# Patient Record
Sex: MALE | Race: WHITE | Employment: OTHER | URBAN - NONMETROPOLITAN AREA
[De-identification: names, ages, dates, MRNs, and addresses within clinical notes are randomized per-mention and may not be internally consistent; named-entity substitution may affect disease eponyms.]

---

## 2018-02-27 DIAGNOSIS — G80.8 OTHER CEREBRAL PALSY (HCC): ICD-10-CM

## 2018-02-27 DIAGNOSIS — N18.9 CHRONIC KIDNEY DISEASE, UNSPECIFIED CKD STAGE: ICD-10-CM

## 2018-02-27 DIAGNOSIS — M15.0 PRIMARY GENERALIZED (OSTEO)ARTHRITIS: ICD-10-CM

## 2018-02-27 DIAGNOSIS — I25.10 ATHEROSCLEROSIS OF NATIVE CORONARY ARTERY OF NATIVE HEART WITHOUT ANGINA PECTORIS: ICD-10-CM

## 2018-02-27 DIAGNOSIS — D64.9 ANEMIA, UNSPECIFIED TYPE: ICD-10-CM

## 2018-02-27 DIAGNOSIS — N39.41 URGE INCONTINENCE OF URINE: ICD-10-CM

## 2018-02-27 DIAGNOSIS — I10 ESSENTIAL (PRIMARY) HYPERTENSION: ICD-10-CM

## 2018-02-27 DIAGNOSIS — K21.9 GASTROESOPHAGEAL REFLUX DISEASE WITHOUT ESOPHAGITIS: ICD-10-CM

## 2018-02-27 DIAGNOSIS — R13.12 DYSPHAGIA, OROPHARYNGEAL PHASE: ICD-10-CM

## 2018-02-27 DIAGNOSIS — N40.1 BENIGN PROSTATIC HYPERPLASIA WITH LOWER URINARY TRACT SYMPTOMS, SYMPTOM DETAILS UNSPECIFIED: ICD-10-CM

## 2018-02-27 PROBLEM — N40.0 BENIGN ENLARGEMENT OF PROSTATE: Status: ACTIVE | Noted: 2018-02-27

## 2018-03-14 ENCOUNTER — OUTSIDE SERVICES (OUTPATIENT)
Dept: FAMILY MEDICINE CLINIC | Age: 78
End: 2018-03-14
Payer: MEDICARE

## 2018-03-14 DIAGNOSIS — K21.9 GASTROESOPHAGEAL REFLUX DISEASE WITHOUT ESOPHAGITIS: ICD-10-CM

## 2018-03-14 DIAGNOSIS — F79 MENTAL RETARDATION: ICD-10-CM

## 2018-03-14 DIAGNOSIS — N18.9 CHRONIC KIDNEY DISEASE, UNSPECIFIED CKD STAGE: ICD-10-CM

## 2018-03-14 DIAGNOSIS — N40.1 BENIGN PROSTATIC HYPERPLASIA WITH LOWER URINARY TRACT SYMPTOMS, SYMPTOM DETAILS UNSPECIFIED: ICD-10-CM

## 2018-03-14 DIAGNOSIS — R13.12 DYSPHAGIA, OROPHARYNGEAL PHASE: ICD-10-CM

## 2018-03-14 DIAGNOSIS — N39.41 URGE INCONTINENCE OF URINE: ICD-10-CM

## 2018-03-14 DIAGNOSIS — M15.0 PRIMARY GENERALIZED (OSTEO)ARTHRITIS: ICD-10-CM

## 2018-03-14 DIAGNOSIS — G80.8 OTHER CEREBRAL PALSY (HCC): Primary | ICD-10-CM

## 2018-03-14 DIAGNOSIS — I10 ESSENTIAL (PRIMARY) HYPERTENSION: ICD-10-CM

## 2018-03-14 DIAGNOSIS — I25.10 ATHEROSCLEROSIS OF NATIVE CORONARY ARTERY OF NATIVE HEART WITHOUT ANGINA PECTORIS: ICD-10-CM

## 2018-03-14 PROCEDURE — 99305 1ST NF CARE MODERATE MDM 35: CPT | Performed by: FAMILY MEDICINE

## 2018-03-15 PROBLEM — F79 MENTAL RETARDATION: Status: ACTIVE | Noted: 2018-03-15

## 2018-03-15 NOTE — PROGRESS NOTES
[address] [address]  [phone]  Dept: [phone]    The following note was scribed by: NATALI Russell is a 78 y.o. male being seen for his initial H&P visit. Location of visit: [address] [place] [address].    HPI:  Admission History:Aruna was admitted to [facility] 3 after he was taken to the ER after a decline in health. He was living in a group home for his cerebal palsy and mental retardation. He was found to be anemic and hypernatremic and admitted to [address] [place] [address] on 11/20/2007 for more thorough long term care. He was previously a patient of Dr. Genesis Santana but was taken under my care after his passing in February 2018. New today: There are no new issues or complaints       ROS:  General Constitutional: Denies fever. Denies lightheadedness. Respiratory: Denies cough. Denies shortness of breath. Denies wheezing. Cardiovascular: Denies chest pain at rest. Denies irregular heartbeat. Gastrointestinal: Denies abdominal pain. Denies blood in the stool. Denies constipation. Denies diarrhea. Denies nausea. Denies vomiting. Genitourinary: Denies blood in the urine. Denies difficulty urinating. Denies frequent urination. Denies painful urination. Skin: Denies rash. Neurologic: Denies falls. Denies dizziness. Esmer Shear Psychiatric: Denies sleep disturbance. Denies anxiety. Denies depressed mood. PHYSICAL EXAM:    General Appearance: in no acute distress, well developed, well nourished, in wheelchair  Eyes: pupils equal, round reactive to light and accommodation. Oral Cavity: mucosa moist.  Neck/Thyroid: neck supple, no cervical lymphadenopathy  Skin: warm and dry  Heart: regular rate and rhythm. No murmurs. S1, S2 normal, no gallops, rate 70  Lungs: breath sounds shallow  Abdomen: soft, nontender, nondistended, no masses or organomegaly.   Extremities: AFO on right, 3+ edema in legs, wearing compression stockings  Peripheral Pulses: 2+ throughout, symetric. Neurologic: sensory exam intact. Psych: MR, communicates with gestures and grunts    ASSESSMENT:  1. Other cerebral palsy ([address]     2. Dysphagia, oropharyngeal phase     3. Essential (primary) hypertension     4. Primary generalized (osteo)arthritis     5. Gastroesophageal reflux disease without esophagitis     6. Atherosclerosis of native coronary artery of native heart without angina pectoris     7. Chronic kidney disease, unspecified CKD stage     8. Urge incontinence of urine     9. Benign prostatic hyperplasia with lower urinary tract symptoms, symptom details unspecified     10. Mental retardation         PLAN:  Xena Nicoláscierra looks great today, no changes are made    No orders of the defined types were placed in this encounter. No orders of the defined types were placed in this encounter. The documentation recorded by the scribe, accurately reflects the services I personally performed and the decisions made by me.  Spencer Stout MD

## 2018-04-25 ENCOUNTER — OUTSIDE SERVICES (OUTPATIENT)
Dept: FAMILY MEDICINE CLINIC | Age: 78
End: 2018-04-25
Payer: MEDICARE

## 2018-04-25 DIAGNOSIS — N18.9 CHRONIC KIDNEY DISEASE, UNSPECIFIED CKD STAGE: ICD-10-CM

## 2018-04-25 DIAGNOSIS — F79 MENTAL RETARDATION: ICD-10-CM

## 2018-04-25 DIAGNOSIS — R13.12 DYSPHAGIA, OROPHARYNGEAL PHASE: ICD-10-CM

## 2018-04-25 DIAGNOSIS — M15.0 PRIMARY GENERALIZED (OSTEO)ARTHRITIS: ICD-10-CM

## 2018-04-25 DIAGNOSIS — G80.8 OTHER CEREBRAL PALSY (HCC): Primary | ICD-10-CM

## 2018-04-25 DIAGNOSIS — K21.9 GASTROESOPHAGEAL REFLUX DISEASE WITHOUT ESOPHAGITIS: ICD-10-CM

## 2018-04-25 DIAGNOSIS — N40.1 BENIGN PROSTATIC HYPERPLASIA WITH LOWER URINARY TRACT SYMPTOMS, SYMPTOM DETAILS UNSPECIFIED: ICD-10-CM

## 2018-04-25 DIAGNOSIS — I25.10 ATHEROSCLEROSIS OF NATIVE CORONARY ARTERY OF NATIVE HEART WITHOUT ANGINA PECTORIS: ICD-10-CM

## 2018-04-25 DIAGNOSIS — I10 ESSENTIAL (PRIMARY) HYPERTENSION: ICD-10-CM

## 2018-04-25 PROCEDURE — 3017F COLORECTAL CA SCREEN DOC REV: CPT | Performed by: FAMILY MEDICINE

## 2018-04-25 PROCEDURE — 1123F ACP DISCUSS/DSCN MKR DOCD: CPT | Performed by: FAMILY MEDICINE

## 2018-04-25 PROCEDURE — 99308 SBSQ NF CARE LOW MDM 20: CPT | Performed by: FAMILY MEDICINE

## 2018-05-30 ENCOUNTER — OUTSIDE SERVICES (OUTPATIENT)
Dept: FAMILY MEDICINE CLINIC | Age: 78
End: 2018-05-30
Payer: MEDICARE

## 2018-05-30 DIAGNOSIS — F79 MENTAL RETARDATION: Primary | ICD-10-CM

## 2018-05-30 DIAGNOSIS — G80.8 OTHER CEREBRAL PALSY (HCC): ICD-10-CM

## 2018-05-30 DIAGNOSIS — I25.10 ATHEROSCLEROSIS OF NATIVE CORONARY ARTERY OF NATIVE HEART WITHOUT ANGINA PECTORIS: ICD-10-CM

## 2018-05-30 DIAGNOSIS — N18.9 CHRONIC KIDNEY DISEASE, UNSPECIFIED CKD STAGE: ICD-10-CM

## 2018-05-30 DIAGNOSIS — I10 ESSENTIAL (PRIMARY) HYPERTENSION: ICD-10-CM

## 2018-05-30 DIAGNOSIS — K21.9 GASTROESOPHAGEAL REFLUX DISEASE WITHOUT ESOPHAGITIS: ICD-10-CM

## 2018-05-30 PROCEDURE — 99308 SBSQ NF CARE LOW MDM 20: CPT | Performed by: FAMILY MEDICINE

## 2018-05-30 PROCEDURE — 1123F ACP DISCUSS/DSCN MKR DOCD: CPT | Performed by: FAMILY MEDICINE

## 2018-06-27 ENCOUNTER — OUTSIDE SERVICES (OUTPATIENT)
Dept: FAMILY MEDICINE CLINIC | Age: 78
End: 2018-06-27
Payer: MEDICARE

## 2018-06-27 DIAGNOSIS — D64.9 ANEMIA, UNSPECIFIED TYPE: ICD-10-CM

## 2018-06-27 DIAGNOSIS — I25.10 ATHEROSCLEROSIS OF NATIVE CORONARY ARTERY OF NATIVE HEART WITHOUT ANGINA PECTORIS: ICD-10-CM

## 2018-06-27 DIAGNOSIS — M15.0 PRIMARY GENERALIZED (OSTEO)ARTHRITIS: ICD-10-CM

## 2018-06-27 DIAGNOSIS — G80.8 OTHER CEREBRAL PALSY (HCC): Primary | ICD-10-CM

## 2018-06-27 DIAGNOSIS — F79 MENTAL RETARDATION: ICD-10-CM

## 2018-06-27 DIAGNOSIS — K59.09 CHRONIC CONSTIPATION: ICD-10-CM

## 2018-06-27 DIAGNOSIS — K21.9 GASTROESOPHAGEAL REFLUX DISEASE WITHOUT ESOPHAGITIS: ICD-10-CM

## 2018-06-27 DIAGNOSIS — I50.810 CHF WITH RIGHT HEART FAILURE (HCC): ICD-10-CM

## 2018-06-27 DIAGNOSIS — I10 ESSENTIAL (PRIMARY) HYPERTENSION: ICD-10-CM

## 2018-06-27 DIAGNOSIS — N18.9 CHRONIC KIDNEY DISEASE, UNSPECIFIED CKD STAGE: ICD-10-CM

## 2018-06-27 DIAGNOSIS — R10.84 GENERALIZED ABDOMINAL PAIN: ICD-10-CM

## 2018-06-27 DIAGNOSIS — R13.12 DYSPHAGIA, OROPHARYNGEAL PHASE: ICD-10-CM

## 2018-06-27 PROCEDURE — 99309 SBSQ NF CARE MODERATE MDM 30: CPT | Performed by: FAMILY MEDICINE

## 2018-06-27 PROCEDURE — 1123F ACP DISCUSS/DSCN MKR DOCD: CPT | Performed by: FAMILY MEDICINE

## 2018-07-11 ENCOUNTER — OUTSIDE SERVICES (OUTPATIENT)
Dept: FAMILY MEDICINE CLINIC | Age: 78
End: 2018-07-11
Payer: MEDICARE

## 2018-07-11 DIAGNOSIS — N18.9 CHRONIC KIDNEY DISEASE, UNSPECIFIED CKD STAGE: ICD-10-CM

## 2018-07-11 DIAGNOSIS — D64.9 ANEMIA, UNSPECIFIED TYPE: ICD-10-CM

## 2018-07-11 DIAGNOSIS — I50.810 CHF WITH RIGHT HEART FAILURE (HCC): ICD-10-CM

## 2018-07-11 DIAGNOSIS — R13.12 DYSPHAGIA, OROPHARYNGEAL PHASE: ICD-10-CM

## 2018-07-11 DIAGNOSIS — I10 UNCONTROLLED HYPERTENSION: ICD-10-CM

## 2018-07-11 DIAGNOSIS — G80.8 OTHER CEREBRAL PALSY (HCC): ICD-10-CM

## 2018-07-11 DIAGNOSIS — I51.7 LEFT VENTRICULAR HYPERTROPHY: Primary | ICD-10-CM

## 2018-07-11 DIAGNOSIS — K59.09 CHRONIC CONSTIPATION: ICD-10-CM

## 2018-07-11 DIAGNOSIS — I25.10 ATHEROSCLEROSIS OF NATIVE CORONARY ARTERY OF NATIVE HEART WITHOUT ANGINA PECTORIS: ICD-10-CM

## 2018-07-11 DIAGNOSIS — K21.9 GASTROESOPHAGEAL REFLUX DISEASE WITHOUT ESOPHAGITIS: ICD-10-CM

## 2018-07-11 DIAGNOSIS — F79 MENTAL RETARDATION: ICD-10-CM

## 2018-07-11 DIAGNOSIS — R10.84 GENERALIZED ABDOMINAL PAIN: ICD-10-CM

## 2018-07-11 DIAGNOSIS — M15.0 PRIMARY GENERALIZED (OSTEO)ARTHRITIS: ICD-10-CM

## 2018-07-11 PROCEDURE — 1101F PT FALLS ASSESS-DOCD LE1/YR: CPT | Performed by: FAMILY MEDICINE

## 2018-07-11 PROCEDURE — 1123F ACP DISCUSS/DSCN MKR DOCD: CPT | Performed by: FAMILY MEDICINE

## 2018-07-11 PROCEDURE — 99309 SBSQ NF CARE MODERATE MDM 30: CPT | Performed by: FAMILY MEDICINE

## 2018-07-11 NOTE — PROGRESS NOTES
painful urination. Skin:  Denies rash. Neurologic: Denies falls. Denies dizziness. Psychiatric: Denies sleep disturbance. Denies anxiety. Denies depressed mood. PHYSICAL EXAM:    General Appearance: in no acute distress, well nourished. Eyes: pupils equal, round reactive to light and accommodation. Oral Cavity: mucosa moist.  Neck/Thyroid:  no cervical lymphadenopathy, no thyromegaly  Skin: warm and dry  Heart: regular rate and rhythm. No murmurs. S1, S2 normal, no gallops. Rate 80  Lungs: clear to auscultation bilaterally. Abdomen:soft, nontender, nondistended, no masses or organomegaly. Extremities: no cyanosis, 3+ edema L, 1-2+ edema R  Peripheral Pulses: 2+ throughout, symetric. Neurologic: verbally responsive, moves extremities. Psych: normal affect, speech fluent. ASSESSMENT:   Diagnosis Orders   1. Left ventricular hypertrophy     2. Other cerebral palsy ([address]     3. Dysphagia, oropharyngeal phase     4. Primary generalized (osteo)arthritis     5. Gastroesophageal reflux disease without esophagitis     6. Anemia, unspecified type     7. Atherosclerosis of native coronary artery of native heart without angina pectoris     8. Chronic kidney disease, unspecified CKD stage     9. Mental retardation     10. Chronic constipation     11. Generalized abdominal pain     12. CHF with right heart failure     13. Uncontrolled hypertension       PLAN:  He is following up with Dr. Melburn Opitz later this month for microscopic hematuria. I will start him on Lisinopril 10 mg qd and check his CBC and BMP in 1 week. His pressures have been in the 150s. I, Dr. Jessica Montalvo, personally performed the services described in this documentation as scribed by RENO Montague in my presence, and is both accurate and complete.

## 2018-07-25 ENCOUNTER — HOSPITAL ENCOUNTER (OUTPATIENT)
Age: 78
Discharge: HOME | End: 2018-07-25
Payer: MEDICARE

## 2018-07-25 ENCOUNTER — OFFICE VISIT (OUTPATIENT)
Dept: UROLOGY | Age: 78
End: 2018-07-25
Payer: MEDICARE

## 2018-07-25 ENCOUNTER — HOSPITAL ENCOUNTER (OUTPATIENT)
Dept: LAB | Age: 78
Discharge: HOME | End: 2018-07-25
Payer: MEDICARE

## 2018-07-25 VITALS
DIASTOLIC BLOOD PRESSURE: 66 MMHG | TEMPERATURE: 98.3 F | WEIGHT: 219 LBS | BODY MASS INDEX: 37.39 KG/M2 | SYSTOLIC BLOOD PRESSURE: 122 MMHG | HEIGHT: 64 IN

## 2018-07-25 DIAGNOSIS — C79.51 MALIGNANT NEOPLASM METASTATIC TO BONE (HCC): ICD-10-CM

## 2018-07-25 DIAGNOSIS — R31.29 MICROSCOPIC HEMATURIA: Primary | ICD-10-CM

## 2018-07-25 DIAGNOSIS — N28.89 RIGHT RENAL MASS: ICD-10-CM

## 2018-07-25 DIAGNOSIS — R31.29 MICROSCOPIC HEMATURIA: ICD-10-CM

## 2018-07-25 LAB
ANION GAP SERPL CALCULATED.3IONS-SCNC: 10 MMOL/L (ref 9–17)
BUN BLDV-MCNC: 26 MG/DL (ref 8–23)
BUN/CREAT BLD: 20 (ref 9–20)
CALCIUM SERPL-MCNC: 9.1 MG/DL (ref 8.6–10.4)
CHLORIDE BLD-SCNC: 101 MMOL/L (ref 98–107)
CO2: 29 MMOL/L (ref 20–31)
CREAT SERPL-MCNC: 1.29 MG/DL (ref 0.7–1.2)
GFR AFRICAN AMERICAN: >60 ML/MIN
GFR NON-AFRICAN AMERICAN: 54 ML/MIN
GFR SERPL CREATININE-BSD FRML MDRD: ABNORMAL ML/MIN/{1.73_M2}
GFR SERPL CREATININE-BSD FRML MDRD: ABNORMAL ML/MIN/{1.73_M2}
GLUCOSE BLD-MCNC: 108 MG/DL (ref 70–99)
POTASSIUM SERPL-SCNC: 4.5 MMOL/L (ref 3.7–5.3)
PROSTATE SPECIFIC ANTIGEN: 0.24 UG/L
SODIUM BLD-SCNC: 140 MMOL/L (ref 135–144)

## 2018-07-25 PROCEDURE — G8417 CALC BMI ABV UP PARAM F/U: HCPCS | Performed by: NURSE PRACTITIONER

## 2018-07-25 PROCEDURE — 99204 OFFICE O/P NEW MOD 45 MIN: CPT | Performed by: NURSE PRACTITIONER

## 2018-07-25 PROCEDURE — 4040F PNEUMOC VAC/ADMIN/RCVD: CPT | Performed by: NURSE PRACTITIONER

## 2018-07-25 PROCEDURE — 1123F ACP DISCUSS/DSCN MKR DOCD: CPT | Performed by: NURSE PRACTITIONER

## 2018-07-25 PROCEDURE — 80048 BASIC METABOLIC PNL TOTAL CA: CPT

## 2018-07-25 PROCEDURE — G8427 DOCREV CUR MEDS BY ELIG CLIN: HCPCS | Performed by: NURSE PRACTITIONER

## 2018-07-25 PROCEDURE — G8599 NO ASA/ANTIPLAT THER USE RNG: HCPCS | Performed by: NURSE PRACTITIONER

## 2018-07-25 PROCEDURE — 1101F PT FALLS ASSESS-DOCD LE1/YR: CPT | Performed by: NURSE PRACTITIONER

## 2018-07-25 PROCEDURE — 36415 COLL VENOUS BLD VENIPUNCTURE: CPT

## 2018-07-25 PROCEDURE — 1036F TOBACCO NON-USER: CPT | Performed by: NURSE PRACTITIONER

## 2018-07-25 PROCEDURE — 84153 ASSAY OF PSA TOTAL: CPT

## 2018-07-25 RX ORDER — GUARN/MA-HUANG/P.GIN/S.GINSENG
600 TABLET ORAL 2 TIMES DAILY
Status: ON HOLD | COMMUNITY
End: 2018-11-20 | Stop reason: HOSPADM

## 2018-07-25 RX ORDER — AMLODIPINE BESYLATE 5 MG/1
5 TABLET ORAL DAILY
Status: ON HOLD | COMMUNITY
Start: 2018-05-21 | End: 2018-11-20 | Stop reason: HOSPADM

## 2018-07-25 RX ORDER — LORATADINE 10 MG/1
10 TABLET ORAL
COMMUNITY

## 2018-07-25 RX ORDER — DOCUSATE SODIUM 100 MG/1
100 CAPSULE, LIQUID FILLED ORAL
Status: ON HOLD | COMMUNITY
End: 2018-11-20 | Stop reason: HOSPADM

## 2018-07-25 RX ORDER — NITROGLYCERIN 0.4 MG/1
0.4 TABLET SUBLINGUAL
COMMUNITY

## 2018-07-25 RX ORDER — DIGOXIN 125 UG/1
125 TABLET ORAL DAILY
COMMUNITY
Start: 2018-05-04

## 2018-07-25 RX ORDER — ESOMEPRAZOLE MAGNESIUM 40 MG/1
40 CAPSULE, DELAYED RELEASE ORAL
COMMUNITY
Start: 2018-05-29

## 2018-07-25 RX ORDER — LACTULOSE 10 G/15ML
SOLUTION ORAL; RECTAL
COMMUNITY
Start: 2018-05-28 | End: 2018-11-14

## 2018-07-25 RX ORDER — GLYCOPYRROLATE 1 MG/1
1 TABLET ORAL
COMMUNITY
End: 2018-11-14

## 2018-07-25 RX ORDER — FOLIC ACID 1 MG/1
1 TABLET ORAL DAILY
COMMUNITY

## 2018-07-25 RX ORDER — LOPERAMIDE HYDROCHLORIDE 2 MG/1
CAPSULE ORAL
Status: ON HOLD | COMMUNITY
Start: 2018-05-16 | End: 2018-11-20 | Stop reason: HOSPADM

## 2018-07-25 RX ORDER — ACETAMINOPHEN 500 MG
1000 TABLET ORAL
COMMUNITY

## 2018-07-25 RX ORDER — ACETAMINOPHEN 650 MG/1
650 SUPPOSITORY RECTAL
COMMUNITY

## 2018-07-25 RX ORDER — FUROSEMIDE 40 MG/1
40 TABLET ORAL DAILY
Status: ON HOLD | COMMUNITY
End: 2018-11-20 | Stop reason: HOSPADM

## 2018-07-25 RX ORDER — FINASTERIDE 5 MG/1
TABLET, FILM COATED ORAL
COMMUNITY
Start: 2018-05-28 | End: 2018-11-14

## 2018-07-25 RX ORDER — GLYCOPYRROLATE 1 MG/1
TABLET ORAL
COMMUNITY
Start: 2018-05-14 | End: 2018-07-25 | Stop reason: CLARIF

## 2018-08-08 ENCOUNTER — HOSPITAL ENCOUNTER (OUTPATIENT)
Dept: CT IMAGING | Age: 78
Discharge: HOME | End: 2018-08-10
Payer: MEDICARE

## 2018-08-08 ENCOUNTER — PROCEDURE VISIT (OUTPATIENT)
Dept: UROLOGY | Age: 78
End: 2018-08-08
Payer: MEDICARE

## 2018-08-08 VITALS — BODY MASS INDEX: 37.59 KG/M2 | WEIGHT: 219 LBS

## 2018-08-08 DIAGNOSIS — N28.89 RIGHT RENAL MASS: ICD-10-CM

## 2018-08-08 DIAGNOSIS — C79.51 MALIGNANT NEOPLASM METASTATIC TO BONE (HCC): ICD-10-CM

## 2018-08-08 DIAGNOSIS — R31.29 MICROSCOPIC HEMATURIA: ICD-10-CM

## 2018-08-08 DIAGNOSIS — N28.89 RENAL MASS: Primary | ICD-10-CM

## 2018-08-08 DIAGNOSIS — R31.29 MICROHEMATURIA: ICD-10-CM

## 2018-08-08 PROCEDURE — 99214 OFFICE O/P EST MOD 30 MIN: CPT | Performed by: UROLOGY

## 2018-08-08 PROCEDURE — 52000 CYSTOURETHROSCOPY: CPT | Performed by: UROLOGY

## 2018-08-08 PROCEDURE — G8427 DOCREV CUR MEDS BY ELIG CLIN: HCPCS | Performed by: UROLOGY

## 2018-08-08 PROCEDURE — 74178 CT ABD&PLV WO CNTR FLWD CNTR: CPT

## 2018-08-08 PROCEDURE — G8417 CALC BMI ABV UP PARAM F/U: HCPCS | Performed by: UROLOGY

## 2018-08-08 PROCEDURE — 4040F PNEUMOC VAC/ADMIN/RCVD: CPT | Performed by: UROLOGY

## 2018-08-08 PROCEDURE — 6360000004 HC RX CONTRAST MEDICATION: Performed by: NURSE PRACTITIONER

## 2018-08-08 PROCEDURE — 1123F ACP DISCUSS/DSCN MKR DOCD: CPT | Performed by: UROLOGY

## 2018-08-08 PROCEDURE — 1101F PT FALLS ASSESS-DOCD LE1/YR: CPT | Performed by: UROLOGY

## 2018-08-08 PROCEDURE — G8599 NO ASA/ANTIPLAT THER USE RNG: HCPCS | Performed by: UROLOGY

## 2018-08-08 PROCEDURE — 1036F TOBACCO NON-USER: CPT | Performed by: UROLOGY

## 2018-08-08 RX ADMIN — IOPAMIDOL 75 ML: 612 INJECTION, SOLUTION INTRAVENOUS at 13:37

## 2018-08-08 ASSESSMENT — ENCOUNTER SYMPTOMS
WHEEZING: 0
COLOR CHANGE: 0
ABDOMINAL PAIN: 0
BACK PAIN: 0
NAUSEA: 0
COUGH: 0
EYE REDNESS: 0
SHORTNESS OF BREATH: 0
EYE PAIN: 0
VOMITING: 0

## 2018-08-12 ASSESSMENT — ENCOUNTER SYMPTOMS
EYE REDNESS: 0
EYE PAIN: 0
BACK PAIN: 0
ABDOMINAL PAIN: 0
WHEEZING: 0
VOMITING: 0
NAUSEA: 0
CONSTIPATION: 0
SHORTNESS OF BREATH: 0
COUGH: 0
COLOR CHANGE: 0

## 2018-08-13 NOTE — PROGRESS NOTES
circumcised  Urethral meatus normal  Scrotal exam normal  Testicles normal bilaterally    No results found for this visit on 07/25/18. Lab Results   Component Value Date    PSA 0.24 07/25/2018     Lab Results   Component Value Date    BUN 26 (H) 07/25/2018     Lab Results   Component Value Date    CREATININE 1.29 (H) 07/25/2018     Review of Systems   Constitutional: Negative for appetite change, chills and fever. Eyes: Negative for pain, redness and visual disturbance. Respiratory: Negative for cough, shortness of breath and wheezing. Cardiovascular: Negative for chest pain and leg swelling. Gastrointestinal: Negative for abdominal pain, constipation, nausea and vomiting. Genitourinary: Negative for decreased urine volume, difficulty urinating, discharge, dysuria, enuresis, flank pain, frequency, hematuria, penile pain, scrotal swelling, testicular pain and urgency. Musculoskeletal: Negative for back pain, joint swelling and myalgias. Skin: Negative for color change, rash and wound. Neurological: Negative for dizziness, tremors and numbness. Hematological: Negative for adenopathy. Does not bruise/bleed easily. Objective:   Physical Exam    Assessment:       Diagnosis Orders   1. Microscopic hematuria  CT ABDOMEN PELVIS W WO CONTRAST Additional Contrast? None    Urinalysis with Microscopic    Urine culture clean catch    PSA, Diagnostic    Basic Metabolic Panel   2. Right renal mass  CT ABDOMEN PELVIS W WO CONTRAST Additional Contrast? None    Urinalysis with Microscopic    Urine culture clean catch    PSA, Diagnostic    Basic Metabolic Panel   3. Malignant neoplasm metastatic to bone [facility])  CT ABDOMEN PELVIS W WO CONTRAST Additional Contrast? None    Urinalysis with Microscopic    Urine culture clean catch    PSA, Diagnostic    Basic Metabolic Panel           Plan: We will check a diagnostic PSA. We will check a UA C&S.   We will proceed with a CT urogram, then patient will return

## 2018-08-29 ENCOUNTER — OUTSIDE SERVICES (OUTPATIENT)
Dept: FAMILY MEDICINE CLINIC | Age: 78
End: 2018-08-29
Payer: MEDICARE

## 2018-08-29 DIAGNOSIS — D64.9 ANEMIA, UNSPECIFIED TYPE: ICD-10-CM

## 2018-08-29 DIAGNOSIS — I10 ESSENTIAL (PRIMARY) HYPERTENSION: ICD-10-CM

## 2018-08-29 DIAGNOSIS — G80.8 OTHER CEREBRAL PALSY (HCC): ICD-10-CM

## 2018-08-29 DIAGNOSIS — M15.0 PRIMARY GENERALIZED (OSTEO)ARTHRITIS: ICD-10-CM

## 2018-08-29 DIAGNOSIS — R13.12 DYSPHAGIA, OROPHARYNGEAL PHASE: ICD-10-CM

## 2018-08-29 DIAGNOSIS — I50.810 CHF WITH RIGHT HEART FAILURE (HCC): ICD-10-CM

## 2018-08-29 DIAGNOSIS — K59.09 CHRONIC CONSTIPATION: ICD-10-CM

## 2018-08-29 DIAGNOSIS — I25.10 ATHEROSCLEROSIS OF NATIVE CORONARY ARTERY OF NATIVE HEART WITHOUT ANGINA PECTORIS: ICD-10-CM

## 2018-08-29 DIAGNOSIS — R10.84 GENERALIZED ABDOMINAL PAIN: ICD-10-CM

## 2018-08-29 DIAGNOSIS — N18.9 CHRONIC KIDNEY DISEASE, UNSPECIFIED CKD STAGE: ICD-10-CM

## 2018-08-29 DIAGNOSIS — F79 MENTAL RETARDATION: ICD-10-CM

## 2018-08-29 DIAGNOSIS — K21.9 GASTROESOPHAGEAL REFLUX DISEASE WITHOUT ESOPHAGITIS: ICD-10-CM

## 2018-08-29 PROCEDURE — 99308 SBSQ NF CARE LOW MDM 20: CPT | Performed by: FAMILY MEDICINE

## 2018-08-29 PROCEDURE — 1123F ACP DISCUSS/DSCN MKR DOCD: CPT | Performed by: FAMILY MEDICINE

## 2018-08-29 PROCEDURE — 1101F PT FALLS ASSESS-DOCD LE1/YR: CPT | Performed by: FAMILY MEDICINE

## 2018-08-29 NOTE — PROGRESS NOTES
[address] [address]  [phone]  Dept: [phone]    RENO NEWBY RMA, am scribing for and in the presence of Dr. Aguila Benedict. 8/29/18/9:15am/SNP    Nathaly Calvo is a 70 y.o. male being seen for his monthly follow up. Location of visit: [address] [place] [address].    HPI:  Admission History:  Linda Ramírez was admitted to [facility] 3 after he was taken to the ER after a decline in health. He was living in a group home for his cerebal palsy and mental retardation. He was found to be anemic and hypernatremic and admitted to [address] [place] [address] on 11/20/2007 for more thorough long term care. He was previously a patient of Dr. Charmayne Angst but was taken under my care after his passing in February 2018. Last visit:  He is following up with Dr. Jarod Marquez later this month for microscopic hematuria. I will start him on Lisinopril 10 mg qd and check his CBC and BMP in 1 week. His pressures have been in the 150s.     Faxes since last seen:  07/11/18 CT of abd and pelvis showed an exophytic lesion on anterior left kidney, development of a soft tissue mass on right renal parenchyma arising from right kidney- faxed to Dr. Jarod Marquez    07/27/18: Lab cultures results recd, he needs to see urology. 07/29/18: Urine culture results recd, referred to Dr. Jarod Marquez.    08/11/18 July orders for Lisinopril and labs were missed, Lisinopril started, do you still want labs?  no wait 2 weeks for BMP    08/13/18: labs recd no changes made. 08/27/18: Labs recd no changes made. New today:  N/A    ROS:  General Constitutional: Denies fever. Denies lightheadedness. Respiratory: Denies cough. Denies shortness of breath. Denies wheezing. Cardiovascular: Denies chest pain at rest.  Gastrointestinal: Denies abdominal pain. Denies blood in the stool. Denies constipation. Denies diarrhea. Denies nausea. Denies vomiting. Genitourinary: Denies blood in the urine. Denies painful urination.   Skin: Denies rash. Neurologic: Denies falls. Denies dizziness. Psychiatric: Denies sleep disturbance. Denies anxiety. Denies depressed mood. PHYSICAL EXAM:    General Appearance: in no acute distress, well nourished. Eyes: pupils equal, round reactive to light and accommodation. Oral Cavity: mucosa moist.  Neck/Thyroid:  no cervical lymphadenopathy, no thyromegaly  Skin: warm and dry  Neurologic: verbally responsive, moves extremities. Psych: normal affect, speech fluent. ASSESSMENT:   Diagnosis Orders   1. Other cerebral palsy ([facility]     2. Dysphagia, oropharyngeal phase     3. Essential (primary) hypertension     4. Primary generalized (osteo)arthritis     5. Gastroesophageal reflux disease without esophagitis     6. Anemia, unspecified type     7. Atherosclerosis of native coronary artery of native heart without angina pectoris     8. Chronic kidney disease, unspecified CKD stage     9. Mental retardation     10. Chronic constipation     11. Generalized abdominal pain     12. CHF with right heart failure ([address]         PLAN:  He seems to be doing well, I will not make any changes today    I, Dr. Adwoa Mcgee, personally performed the services described in this documentation as scribed by RENO Gallardo in my presence, and is both accurate and complete.

## 2018-09-26 ENCOUNTER — OUTSIDE SERVICES (OUTPATIENT)
Dept: FAMILY MEDICINE CLINIC | Age: 78
End: 2018-09-26
Payer: MEDICARE

## 2018-09-26 DIAGNOSIS — I10 ESSENTIAL (PRIMARY) HYPERTENSION: ICD-10-CM

## 2018-09-26 DIAGNOSIS — I50.810 CHF WITH RIGHT HEART FAILURE (HCC): ICD-10-CM

## 2018-09-26 DIAGNOSIS — R05.9 COUGH: ICD-10-CM

## 2018-09-26 DIAGNOSIS — K21.9 GASTROESOPHAGEAL REFLUX DISEASE WITHOUT ESOPHAGITIS: ICD-10-CM

## 2018-09-26 DIAGNOSIS — I25.10 ATHEROSCLEROSIS OF NATIVE CORONARY ARTERY OF NATIVE HEART WITHOUT ANGINA PECTORIS: ICD-10-CM

## 2018-09-26 DIAGNOSIS — M15.0 PRIMARY GENERALIZED (OSTEO)ARTHRITIS: ICD-10-CM

## 2018-09-26 DIAGNOSIS — N18.9 CHRONIC KIDNEY DISEASE, UNSPECIFIED CKD STAGE: ICD-10-CM

## 2018-09-26 DIAGNOSIS — G80.8 OTHER CEREBRAL PALSY (HCC): Primary | ICD-10-CM

## 2018-09-26 DIAGNOSIS — K59.09 CHRONIC CONSTIPATION: ICD-10-CM

## 2018-09-26 DIAGNOSIS — R13.12 DYSPHAGIA, OROPHARYNGEAL PHASE: ICD-10-CM

## 2018-09-26 DIAGNOSIS — F79 MENTAL RETARDATION: ICD-10-CM

## 2018-09-26 DIAGNOSIS — R06.2 WHEEZING: ICD-10-CM

## 2018-09-26 PROCEDURE — 1101F PT FALLS ASSESS-DOCD LE1/YR: CPT | Performed by: FAMILY MEDICINE

## 2018-09-26 PROCEDURE — 1123F ACP DISCUSS/DSCN MKR DOCD: CPT | Performed by: FAMILY MEDICINE

## 2018-09-26 PROCEDURE — 99309 SBSQ NF CARE MODERATE MDM 30: CPT | Performed by: FAMILY MEDICINE

## 2018-09-26 NOTE — PROGRESS NOTES
[address] [address]  [phone]  Dept: [phone]    NATALI NEWBY, am scribing for and in the presence of Dr. Josue Echols. 9/26/18/8:12 am/PRASHANT    Fernando Loera is a 70 y.o. male being seen for his monthly follow up. Location of visit: [address] [place] [address].    HPI:  Admission History:  Jose Sibley was admitted to [facility] 3 after he was taken to the ER after a decline in health. He was living in a group home for his cerebal palsy and mental retardation. He was found to be anemic and hypernatremic and admitted to [address] [place] [address] on 11/20/2007 for more thorough long term care. He was previously a patient of Dr. Lawton Seen but was taken under my care after his passing in February 2018. Last visit: 8/29/18 I made no changes    Faxes since last seen:  o 09/11/18: He has congestion, sore throat, temp 101, wheezing. Duoneb aerosol qid, chest x-ray ordered, Cbc w/ diff and BMP ordered, Rocephin 1 gram IM now. o 09/12/18: Labs recd, no changes made. o 09/13/18: Chest x-ray ordered signed  o 09/17/18: He has had an increase of coughing with meals, ST eval and tx.  o 9/23/18 Family concerned that pt stil has cold symptoms, continues to cough and wheeze, has had CXR, Rocephin 1 gram IM, DuoNeb and Labs  consider swallow eval, he may be chronically aspirating, I will check him 9/26    New today: Pt is still coughing, SOB and Wheezing    ROS:  General Constitutional: Denies fever. Denies lightheadedness. Respiratory: Admits cough. Admits shortness of breath. Admits wheezing. Cardiovascular: Denies chest pain at rest.  Gastrointestinal: Denies abdominal pain. Denies blood in the stool. Denies constipation. Denies diarrhea. Denies nausea. Denies vomiting. Genitourinary: Denies blood in the urine. Denies painful urination. Skin:  Denies rash. Neurologic: Denies falls. Denies dizziness. Psychiatric: Denies sleep disturbance. Denies anxiety.  Denies depressed mood. PHYSICAL EXAM:    General Appearance: in no acute distress, well nourished, in wheelchair  Eyes: pupils equal, round reactive to light and accommodation, wearing glasses  Oral Cavity: mucosa moist.  Neck/Thyroid:  no cervical lymphadenopathy, no thyromegaly  Skin: warm and dry  Heart: regular rate and rhythm. No murmurs. S1, S2 normal, no gallops, rate 90  Lungs: wheezing throughout  Abdomen:soft, nontender, nondistended, no masses or organomegaly. Extremities: no cyanosis or edema. Peripheral Pulses: 2+ throughout, symetric. Neurologic: verbally responsive via grunting and gesturing, moves extremities. Psych: normal affect    ASSESSMENT:   Diagnosis Orders   1. Other cerebral palsy ([facility]     2. Essential (primary) hypertension     3. Primary generalized (osteo)arthritis     4. Dysphagia, oropharyngeal phase     5. Gastroesophageal reflux disease without esophagitis     6. Atherosclerosis of native coronary artery of native heart without angina pectoris     7. Chronic kidney disease, unspecified CKD stage     8. Mental retardation     9. Chronic constipation     10. CHF with right heart failure ([address]     11. Cough     12. 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 Luis Antonio Madrigal, personally performed the services described in this documentation as scribed by NATALI Gonzalez in my presence, and is both accurate and complete.

## 2018-10-26 PROBLEM — R05.9 COUGH: Status: RESOLVED | Noted: 2018-09-26 | Resolved: 2018-10-26

## 2018-10-31 ENCOUNTER — OUTSIDE SERVICES (OUTPATIENT)
Dept: FAMILY MEDICINE CLINIC | Age: 78
End: 2018-10-31
Payer: MEDICARE

## 2018-10-31 DIAGNOSIS — R06.2 WHEEZING: ICD-10-CM

## 2018-10-31 DIAGNOSIS — I10 ESSENTIAL (PRIMARY) HYPERTENSION: ICD-10-CM

## 2018-10-31 DIAGNOSIS — N18.9 CHRONIC KIDNEY DISEASE, UNSPECIFIED CKD STAGE: ICD-10-CM

## 2018-10-31 DIAGNOSIS — G80.8 OTHER CEREBRAL PALSY (HCC): ICD-10-CM

## 2018-10-31 DIAGNOSIS — K59.09 CHRONIC CONSTIPATION: ICD-10-CM

## 2018-10-31 DIAGNOSIS — F79 MENTAL RETARDATION: ICD-10-CM

## 2018-10-31 DIAGNOSIS — K59.00 CONSTIPATION, UNSPECIFIED CONSTIPATION TYPE: Primary | ICD-10-CM

## 2018-10-31 DIAGNOSIS — M15.0 PRIMARY GENERALIZED (OSTEO)ARTHRITIS: ICD-10-CM

## 2018-10-31 DIAGNOSIS — I50.810 CHF WITH RIGHT HEART FAILURE (HCC): ICD-10-CM

## 2018-10-31 DIAGNOSIS — K21.9 GASTROESOPHAGEAL REFLUX DISEASE WITHOUT ESOPHAGITIS: ICD-10-CM

## 2018-10-31 PROCEDURE — 99309 SBSQ NF CARE MODERATE MDM 30: CPT | Performed by: FAMILY MEDICINE

## 2018-10-31 PROCEDURE — G8484 FLU IMMUNIZE NO ADMIN: HCPCS | Performed by: FAMILY MEDICINE

## 2018-10-31 PROCEDURE — 1123F ACP DISCUSS/DSCN MKR DOCD: CPT | Performed by: FAMILY MEDICINE

## 2018-10-31 PROCEDURE — 1101F PT FALLS ASSESS-DOCD LE1/YR: CPT | Performed by: FAMILY MEDICINE

## 2018-11-07 ENCOUNTER — OUTSIDE SERVICES (OUTPATIENT)
Dept: FAMILY MEDICINE CLINIC | Age: 78
End: 2018-11-07

## 2018-11-07 DIAGNOSIS — I10 ESSENTIAL (PRIMARY) HYPERTENSION: ICD-10-CM

## 2018-11-07 DIAGNOSIS — K21.9 GASTROESOPHAGEAL REFLUX DISEASE WITHOUT ESOPHAGITIS: ICD-10-CM

## 2018-11-07 DIAGNOSIS — I50.810 CHF WITH RIGHT HEART FAILURE (HCC): ICD-10-CM

## 2018-11-07 DIAGNOSIS — N18.9 CHRONIC KIDNEY DISEASE, UNSPECIFIED CKD STAGE: ICD-10-CM

## 2018-11-07 DIAGNOSIS — G80.8 OTHER CEREBRAL PALSY (HCC): ICD-10-CM

## 2018-11-07 DIAGNOSIS — F79 MENTAL RETARDATION: ICD-10-CM

## 2018-11-07 DIAGNOSIS — K59.00 CONSTIPATION, UNSPECIFIED CONSTIPATION TYPE: ICD-10-CM

## 2018-11-07 NOTE — PROGRESS NOTES
changes made    11/2/18: [facility] script changed to [employer] d/t insurance coverage    New today:  1 month ago something happened and he was bleeding out of his mouth, nothing was witnessed    ROS:  General Constitutional: Denies fever. Denies lightheadedness. Respiratory: Denies cough. Denies shortness of breath. Denies wheezing. Cardiovascular: Denies chest pain at rest.  Gastrointestinal: Denies abdominal pain. Denies blood in the stool. Denies constipation. Denies diarrhea. Denies nausea. Denies vomiting. Genitourinary: Denies blood in the urine. Denies painful urination. Skin:  Denies rash. Neurologic: Denies falls. Denies dizziness. Psychiatric: Denies sleep disturbance. Denies anxiety. Denies depressed mood. PHYSICAL EXAM:    General Appearance: in no acute distress, well nourished. Eyes: pupils equal, round reactive to light and accommodation. Oral Cavity: mucosa moist.  Neck/Thyroid:  no cervical lymphadenopathy, no thyromegaly  Skin: warm and dry  Heart: regular rate and rhythm. No murmurs. S1, S2 normal, no gallops. Lungs: clear to auscultation bilaterally. Abdomen:soft, nontender, nondistended, no masses or organomegaly. Extremities: no cyanosis or edema. Peripheral Pulses: 2+ throughout, symetric. Neurologic: verbally responsive, moves extremities. Psych: normal affect, speech fluent. ASSESSMENT:   Diagnosis Orders   1. Other cerebral palsy ([address]     2. Essential (primary) hypertension     3. Gastroesophageal reflux disease without esophagitis     4. Chronic kidney disease, unspecified CKD stage     5. Mental retardation     6. Constipation, unspecified constipation type     7. CHF with right heart failure ([address]         PLAN:  He looks better, I will get a f/u chest x-ray. I also will decrease Duoneb to bid. I am concerned that unwitnessed episode in the dining room could have been a seizure, I will order an EEG.  I also will get a CT scan of his brain for eval of mental status change. I also will re-check his BMP, creatinine bumped up a bit. I will discontinue MOM. I, Dr. Masha Ro, personally performed the services described in this documentation as scribed by RENO Monzon in my presence, and is both accurate and complete.

## 2018-11-14 ENCOUNTER — APPOINTMENT (OUTPATIENT)
Dept: CT IMAGING | Age: 78
DRG: 193 | End: 2018-11-14
Payer: MEDICARE

## 2018-11-14 ENCOUNTER — OUTSIDE SERVICES (OUTPATIENT)
Dept: FAMILY MEDICINE CLINIC | Age: 78
End: 2018-11-14

## 2018-11-14 ENCOUNTER — APPOINTMENT (OUTPATIENT)
Dept: GENERAL RADIOLOGY | Age: 78
DRG: 193 | End: 2018-11-14
Payer: MEDICARE

## 2018-11-14 ENCOUNTER — HOSPITAL ENCOUNTER (INPATIENT)
Age: 78
LOS: 6 days | Discharge: NURSING, REHAB OR LONG-TERM CARE | DRG: 193 | End: 2018-11-20
Attending: EMERGENCY MEDICINE | Admitting: FAMILY MEDICINE
Payer: MEDICARE

## 2018-11-14 DIAGNOSIS — J18.9 PNEUMONIA OF LEFT LOWER LOBE DUE TO INFECTIOUS ORGANISM: Primary | ICD-10-CM

## 2018-11-14 DIAGNOSIS — R10.13 EPIGASTRIC PAIN: ICD-10-CM

## 2018-11-14 DIAGNOSIS — K59.00 CONSTIPATION, UNSPECIFIED CONSTIPATION TYPE: ICD-10-CM

## 2018-11-14 DIAGNOSIS — R06.2 WHEEZING: ICD-10-CM

## 2018-11-14 DIAGNOSIS — J96.01 ACUTE RESPIRATORY FAILURE WITH HYPOXEMIA (HCC): Primary | ICD-10-CM

## 2018-11-14 DIAGNOSIS — E86.0 DEHYDRATION: ICD-10-CM

## 2018-11-14 PROBLEM — N17.0 ACUTE RENAL FAILURE WITH ACUTE TUBULAR NECROSIS SUPERIMPOSED ON STAGE 3 CHRONIC KIDNEY DISEASE (HCC): Status: ACTIVE | Noted: 2018-11-14

## 2018-11-14 PROBLEM — J45.41 MODERATE PERSISTENT ASTHMA WITH ACUTE EXACERBATION: Status: ACTIVE | Noted: 2018-11-14

## 2018-11-14 PROBLEM — N18.30 ACUTE RENAL FAILURE WITH ACUTE TUBULAR NECROSIS SUPERIMPOSED ON STAGE 3 CHRONIC KIDNEY DISEASE (HCC): Status: ACTIVE | Noted: 2018-11-14

## 2018-11-14 LAB
ABSOLUTE EOS #: 0.21 K/UL (ref 0–0.44)
ABSOLUTE IMMATURE GRANULOCYTE: 0.05 K/UL (ref 0–0.3)
ABSOLUTE LYMPH #: 0.86 K/UL (ref 1.1–3.7)
ABSOLUTE MONO #: 0.7 K/UL (ref 0.1–1.2)
ALBUMIN SERPL-MCNC: 3.5 G/DL (ref 3.5–5.2)
ALBUMIN/GLOBULIN RATIO: 0.9 (ref 1–2.5)
ALLEN TEST: ABNORMAL
ALP BLD-CCNC: 87 U/L (ref 40–129)
ALT SERPL-CCNC: 24 U/L (ref 5–41)
ANION GAP SERPL CALCULATED.3IONS-SCNC: 14 MMOL/L (ref 9–17)
AST SERPL-CCNC: 13 U/L
BASOPHILS # BLD: 0 % (ref 0–2)
BASOPHILS ABSOLUTE: 0.03 K/UL (ref 0–0.2)
BILIRUB SERPL-MCNC: 0.76 MG/DL (ref 0.3–1.2)
BUN BLDV-MCNC: 52 MG/DL (ref 8–23)
BUN/CREAT BLD: 30 (ref 9–20)
CALCIUM SERPL-MCNC: 8.8 MG/DL (ref 8.6–10.4)
CARBOXYHEMOGLOBIN: ABNORMAL % (ref 0–5)
CHLORIDE BLD-SCNC: 107 MMOL/L (ref 98–107)
CO2: 31 MMOL/L (ref 20–31)
CREAT SERPL-MCNC: 1.75 MG/DL (ref 0.7–1.2)
DIFFERENTIAL TYPE: ABNORMAL
EKG ATRIAL RATE: 100 BPM
EKG P AXIS: 42 DEGREES
EKG P-R INTERVAL: 146 MS
EKG Q-T INTERVAL: 314 MS
EKG QRS DURATION: 88 MS
EKG QTC CALCULATION (BAZETT): 405 MS
EKG R AXIS: -4 DEGREES
EKG T AXIS: 16 DEGREES
EKG VENTRICULAR RATE: 100 BPM
EOSINOPHILS RELATIVE PERCENT: 2 % (ref 1–4)
FIO2: 21
GFR AFRICAN AMERICAN: 46 ML/MIN
GFR NON-AFRICAN AMERICAN: 38 ML/MIN
GFR SERPL CREATININE-BSD FRML MDRD: ABNORMAL ML/MIN/{1.73_M2}
GFR SERPL CREATININE-BSD FRML MDRD: ABNORMAL ML/MIN/{1.73_M2}
GLUCOSE BLD-MCNC: 111 MG/DL (ref 70–99)
HCO3 VENOUS: 33.7 MMOL/L (ref 24–30)
HCT VFR BLD CALC: 47.2 % (ref 40.7–50.3)
HEMOGLOBIN: 15.2 G/DL (ref 13–17)
IMMATURE GRANULOCYTES: 1 %
LACTIC ACID, WHOLE BLOOD: NORMAL MMOL/L (ref 0.7–2.1)
LACTIC ACID: 1 MMOL/L (ref 0.5–2.2)
LYMPHOCYTES # BLD: 9 % (ref 24–43)
MCH RBC QN AUTO: 31.3 PG (ref 25.2–33.5)
MCHC RBC AUTO-ENTMCNC: 32.2 G/DL (ref 28.4–34.8)
MCV RBC AUTO: 97.1 FL (ref 82.6–102.9)
METHEMOGLOBIN: ABNORMAL % (ref 0–1.9)
MODE: ABNORMAL
MONOCYTES # BLD: 7 % (ref 3–12)
NEGATIVE BASE EXCESS, VEN: ABNORMAL MMOL/L (ref 0–2)
NOTIFICATION TIME: ABNORMAL
NOTIFICATION: ABNORMAL
NRBC AUTOMATED: 0 PER 100 WBC
O2 DEVICE/FLOW/%: ABNORMAL
O2 SAT, VEN: 90.8 % (ref 60–85)
OXYHEMOGLOBIN: ABNORMAL % (ref 95–98)
PATIENT TEMP: 37
PCO2, VEN, TEMP ADJ: ABNORMAL MMHG (ref 39–55)
PCO2, VEN: 49.3 (ref 39–55)
PDW BLD-RTO: 14.4 % (ref 11.8–14.4)
PEEP/CPAP: ABNORMAL
PH VENOUS: 7.45 (ref 7.32–7.42)
PH, VEN, TEMP ADJ: ABNORMAL (ref 7.32–7.42)
PLATELET # BLD: 154 K/UL (ref 138–453)
PLATELET ESTIMATE: ABNORMAL
PMV BLD AUTO: 12.7 FL (ref 8.1–13.5)
PO2, VEN, TEMP ADJ: ABNORMAL MMHG (ref 30–50)
PO2, VEN: 57.4 (ref 30–50)
POSITIVE BASE EXCESS, VEN: 8.3 MMOL/L (ref 0–2)
POTASSIUM SERPL-SCNC: 4.2 MMOL/L (ref 3.7–5.3)
PSV: ABNORMAL
PT. POSITION: ABNORMAL
RBC # BLD: 4.86 M/UL (ref 4.21–5.77)
RBC # BLD: ABNORMAL 10*6/UL
RESPIRATORY RATE: ABNORMAL
SAMPLE SITE: ABNORMAL
SEG NEUTROPHILS: 81 % (ref 36–65)
SEGMENTED NEUTROPHILS ABSOLUTE COUNT: 8.1 K/UL (ref 1.5–8.1)
SET RATE: ABNORMAL
SODIUM BLD-SCNC: 152 MMOL/L (ref 135–144)
TEXT FOR RESPIRATORY: ABNORMAL
TOTAL HB: ABNORMAL G/DL (ref 12–16)
TOTAL PROTEIN: 7.3 G/DL (ref 6.4–8.3)
TOTAL RATE: ABNORMAL
TROPONIN INTERP: NORMAL
TROPONIN T: <0.03 NG/ML
VT: ABNORMAL
WBC # BLD: 10 K/UL (ref 3.5–11.3)
WBC # BLD: ABNORMAL 10*3/UL

## 2018-11-14 PROCEDURE — 94667 MNPJ CHEST WALL 1ST: CPT

## 2018-11-14 PROCEDURE — 6370000000 HC RX 637 (ALT 250 FOR IP): Performed by: FAMILY MEDICINE

## 2018-11-14 PROCEDURE — G8997 SWALLOW GOAL STATUS: HCPCS

## 2018-11-14 PROCEDURE — 94664 DEMO&/EVAL PT USE INHALER: CPT

## 2018-11-14 PROCEDURE — 80053 COMPREHEN METABOLIC PANEL: CPT

## 2018-11-14 PROCEDURE — 83605 ASSAY OF LACTIC ACID: CPT

## 2018-11-14 PROCEDURE — 97163 PT EVAL HIGH COMPLEX 45 MIN: CPT

## 2018-11-14 PROCEDURE — 84484 ASSAY OF TROPONIN QUANT: CPT

## 2018-11-14 PROCEDURE — 6360000002 HC RX W HCPCS: Performed by: FAMILY MEDICINE

## 2018-11-14 PROCEDURE — G8996 SWALLOW CURRENT STATUS: HCPCS

## 2018-11-14 PROCEDURE — 6360000002 HC RX W HCPCS: Performed by: EMERGENCY MEDICINE

## 2018-11-14 PROCEDURE — 1200000000 HC SEMI PRIVATE

## 2018-11-14 PROCEDURE — G8484 FLU IMMUNIZE NO ADMIN: HCPCS | Performed by: FAMILY MEDICINE

## 2018-11-14 PROCEDURE — 99285 EMERGENCY DEPT VISIT HI MDM: CPT

## 2018-11-14 PROCEDURE — 1123F ACP DISCUSS/DSCN MKR DOCD: CPT | Performed by: FAMILY MEDICINE

## 2018-11-14 PROCEDURE — 87040 BLOOD CULTURE FOR BACTERIA: CPT

## 2018-11-14 PROCEDURE — 6370000000 HC RX 637 (ALT 250 FOR IP)

## 2018-11-14 PROCEDURE — 99999 PR OFFICE/OUTPT VISIT,PROCEDURE ONLY: CPT | Performed by: FAMILY MEDICINE

## 2018-11-14 PROCEDURE — 85025 COMPLETE CBC W/AUTO DIFF WBC: CPT

## 2018-11-14 PROCEDURE — 36415 COLL VENOUS BLD VENIPUNCTURE: CPT

## 2018-11-14 PROCEDURE — 74176 CT ABD & PELVIS W/O CONTRAST: CPT

## 2018-11-14 PROCEDURE — 2700000000 HC OXYGEN THERAPY PER DAY

## 2018-11-14 PROCEDURE — 82805 BLOOD GASES W/O2 SATURATION: CPT

## 2018-11-14 PROCEDURE — 94640 AIRWAY INHALATION TREATMENT: CPT

## 2018-11-14 PROCEDURE — 99222 1ST HOSP IP/OBS MODERATE 55: CPT | Performed by: FAMILY MEDICINE

## 2018-11-14 PROCEDURE — 2580000003 HC RX 258: Performed by: EMERGENCY MEDICINE

## 2018-11-14 PROCEDURE — G8978 MOBILITY CURRENT STATUS: HCPCS

## 2018-11-14 PROCEDURE — 71045 X-RAY EXAM CHEST 1 VIEW: CPT

## 2018-11-14 PROCEDURE — 1101F PT FALLS ASSESS-DOCD LE1/YR: CPT | Performed by: FAMILY MEDICINE

## 2018-11-14 PROCEDURE — 6370000000 HC RX 637 (ALT 250 FOR IP): Performed by: EMERGENCY MEDICINE

## 2018-11-14 PROCEDURE — 71250 CT THORAX DX C-: CPT

## 2018-11-14 PROCEDURE — 2580000003 HC RX 258: Performed by: FAMILY MEDICINE

## 2018-11-14 PROCEDURE — 93005 ELECTROCARDIOGRAM TRACING: CPT

## 2018-11-14 PROCEDURE — G8979 MOBILITY GOAL STATUS: HCPCS

## 2018-11-14 RX ORDER — BUDESONIDE 0.5 MG/2ML
1 INHALANT ORAL 2 TIMES DAILY
COMMUNITY

## 2018-11-14 RX ORDER — IPRATROPIUM BROMIDE AND ALBUTEROL SULFATE 2.5; .5 MG/3ML; MG/3ML
1 SOLUTION RESPIRATORY (INHALATION) 2 TIMES DAILY
Status: ON HOLD | COMMUNITY
End: 2018-11-20 | Stop reason: HOSPADM

## 2018-11-14 RX ORDER — ALBUTEROL SULFATE 2.5 MG/3ML
2.5 SOLUTION RESPIRATORY (INHALATION) EVERY 4 HOURS PRN
Status: DISCONTINUED | OUTPATIENT
Start: 2018-11-14 | End: 2018-11-20 | Stop reason: HOSPADM

## 2018-11-14 RX ORDER — LISINOPRIL 10 MG/1
10 TABLET ORAL DAILY
Status: ON HOLD | COMMUNITY
End: 2018-11-20 | Stop reason: HOSPADM

## 2018-11-14 RX ORDER — BISACODYL 10 MG
10 SUPPOSITORY, RECTAL RECTAL DAILY PRN
COMMUNITY

## 2018-11-14 RX ORDER — SODIUM CHLORIDE 9 MG/ML
INJECTION, SOLUTION INTRAVENOUS CONTINUOUS
Status: DISCONTINUED | OUTPATIENT
Start: 2018-11-14 | End: 2018-11-15

## 2018-11-14 RX ORDER — POLYETHYLENE GLYCOL 3350 17 G/17G
17 POWDER, FOR SOLUTION ORAL 2 TIMES DAILY
Status: DISCONTINUED | OUTPATIENT
Start: 2018-11-14 | End: 2018-11-20 | Stop reason: HOSPADM

## 2018-11-14 RX ORDER — IPRATROPIUM BROMIDE AND ALBUTEROL SULFATE 2.5; .5 MG/3ML; MG/3ML
1 SOLUTION RESPIRATORY (INHALATION)
Status: DISCONTINUED | OUTPATIENT
Start: 2018-11-14 | End: 2018-11-14

## 2018-11-14 RX ORDER — LACTULOSE 10 G/10G
30 SOLUTION ORAL 2 TIMES DAILY
Status: ON HOLD | COMMUNITY
End: 2018-11-20 | Stop reason: HOSPADM

## 2018-11-14 RX ORDER — SODIUM CHLORIDE 0.9 % (FLUSH) 0.9 %
10 SYRINGE (ML) INJECTION PRN
Status: DISCONTINUED | OUTPATIENT
Start: 2018-11-14 | End: 2018-11-20 | Stop reason: HOSPADM

## 2018-11-14 RX ORDER — ACETAMINOPHEN 325 MG/1
650 TABLET ORAL EVERY 4 HOURS PRN
Status: DISCONTINUED | OUTPATIENT
Start: 2018-11-14 | End: 2018-11-20 | Stop reason: HOSPADM

## 2018-11-14 RX ORDER — LEVOFLOXACIN 5 MG/ML
750 INJECTION, SOLUTION INTRAVENOUS
Status: DISCONTINUED | OUTPATIENT
Start: 2018-11-14 | End: 2018-11-16

## 2018-11-14 RX ORDER — IPRATROPIUM BROMIDE AND ALBUTEROL SULFATE 2.5; .5 MG/3ML; MG/3ML
1 SOLUTION RESPIRATORY (INHALATION) ONCE
Status: COMPLETED | OUTPATIENT
Start: 2018-11-14 | End: 2018-11-14

## 2018-11-14 RX ORDER — ACETAMINOPHEN 500 MG
1000 TABLET ORAL EVERY 6 HOURS PRN
Status: DISCONTINUED | OUTPATIENT
Start: 2018-11-14 | End: 2018-11-20 | Stop reason: HOSPADM

## 2018-11-14 RX ORDER — SODIUM CHLORIDE 0.9 % (FLUSH) 0.9 %
10 SYRINGE (ML) INJECTION EVERY 12 HOURS SCHEDULED
Status: DISCONTINUED | OUTPATIENT
Start: 2018-11-14 | End: 2018-11-20 | Stop reason: HOSPADM

## 2018-11-14 RX ORDER — SODIUM CHLORIDE 9 MG/ML
150 INJECTION, SOLUTION INTRAVENOUS CONTINUOUS
Status: DISCONTINUED | OUTPATIENT
Start: 2018-11-14 | End: 2018-11-14 | Stop reason: ALTCHOICE

## 2018-11-14 RX ORDER — IPRATROPIUM BROMIDE AND ALBUTEROL SULFATE 2.5; .5 MG/3ML; MG/3ML
1 SOLUTION RESPIRATORY (INHALATION) 2 TIMES DAILY
Status: DISCONTINUED | OUTPATIENT
Start: 2018-11-14 | End: 2018-11-14 | Stop reason: SDUPTHER

## 2018-11-14 RX ORDER — LACTULOSE 10 G/15ML
30 SOLUTION ORAL 2 TIMES DAILY
Status: DISCONTINUED | OUTPATIENT
Start: 2018-11-14 | End: 2018-11-20 | Stop reason: HOSPADM

## 2018-11-14 RX ORDER — IPRATROPIUM BROMIDE AND ALBUTEROL SULFATE 2.5; .5 MG/3ML; MG/3ML
1 SOLUTION RESPIRATORY (INHALATION) EVERY 6 HOURS PRN
Status: DISCONTINUED | OUTPATIENT
Start: 2018-11-14 | End: 2018-11-14

## 2018-11-14 RX ORDER — BUDESONIDE 0.5 MG/2ML
500 INHALANT ORAL 2 TIMES DAILY
Status: DISCONTINUED | OUTPATIENT
Start: 2018-11-14 | End: 2018-11-20 | Stop reason: HOSPADM

## 2018-11-14 RX ORDER — LEVOFLOXACIN 5 MG/ML
750 INJECTION, SOLUTION INTRAVENOUS
Status: DISCONTINUED | OUTPATIENT
Start: 2018-11-14 | End: 2018-11-14

## 2018-11-14 RX ORDER — IPRATROPIUM BROMIDE AND ALBUTEROL SULFATE 2.5; .5 MG/3ML; MG/3ML
1 SOLUTION RESPIRATORY (INHALATION) 4 TIMES DAILY
Status: DISCONTINUED | OUTPATIENT
Start: 2018-11-14 | End: 2018-11-15

## 2018-11-14 RX ORDER — FOLIC ACID 1 MG/1
1 TABLET ORAL DAILY
Status: DISCONTINUED | OUTPATIENT
Start: 2018-11-15 | End: 2018-11-20 | Stop reason: HOSPADM

## 2018-11-14 RX ORDER — IPRATROPIUM BROMIDE AND ALBUTEROL SULFATE 2.5; .5 MG/3ML; MG/3ML
SOLUTION RESPIRATORY (INHALATION)
Status: COMPLETED
Start: 2018-11-14 | End: 2018-11-14

## 2018-11-14 RX ORDER — IPRATROPIUM BROMIDE AND ALBUTEROL SULFATE 2.5; .5 MG/3ML; MG/3ML
1 SOLUTION RESPIRATORY (INHALATION) EVERY 6 HOURS PRN
COMMUNITY

## 2018-11-14 RX ORDER — BISACODYL 10 MG
10 SUPPOSITORY, RECTAL RECTAL DAILY PRN
Status: DISCONTINUED | OUTPATIENT
Start: 2018-11-14 | End: 2018-11-20 | Stop reason: HOSPADM

## 2018-11-14 RX ORDER — ONDANSETRON 2 MG/ML
4 INJECTION INTRAMUSCULAR; INTRAVENOUS EVERY 6 HOURS PRN
Status: DISCONTINUED | OUTPATIENT
Start: 2018-11-14 | End: 2018-11-20 | Stop reason: HOSPADM

## 2018-11-14 RX ADMIN — SODIUM CHLORIDE: 9 INJECTION, SOLUTION INTRAVENOUS at 23:28

## 2018-11-14 RX ADMIN — IPRATROPIUM BROMIDE AND ALBUTEROL SULFATE 1 AMPULE: .5; 3 SOLUTION RESPIRATORY (INHALATION) at 21:44

## 2018-11-14 RX ADMIN — SODIUM CHLORIDE 150 ML/HR: 9 INJECTION, SOLUTION INTRAVENOUS at 12:45

## 2018-11-14 RX ADMIN — POLYETHYLENE GLYCOL (3350) 17 G: 17 POWDER, FOR SOLUTION ORAL at 23:02

## 2018-11-14 RX ADMIN — IPRATROPIUM BROMIDE AND ALBUTEROL SULFATE 1 AMPULE: .5; 3 SOLUTION RESPIRATORY (INHALATION) at 17:46

## 2018-11-14 RX ADMIN — ENOXAPARIN SODIUM 40 MG: 40 INJECTION SUBCUTANEOUS at 14:42

## 2018-11-14 RX ADMIN — LEVOFLOXACIN 750 MG: 5 INJECTION, SOLUTION INTRAVENOUS at 14:42

## 2018-11-14 RX ADMIN — LACTULOSE 30 G: 20 SOLUTION ORAL at 23:02

## 2018-11-14 RX ADMIN — PIPERACILLIN SODIUM,TAZOBACTAM SODIUM 3.38 G: 3; .375 INJECTION, POWDER, FOR SOLUTION INTRAVENOUS at 16:30

## 2018-11-14 RX ADMIN — IPRATROPIUM BROMIDE AND ALBUTEROL SULFATE 1 AMPULE: .5; 3 SOLUTION RESPIRATORY (INHALATION) at 10:05

## 2018-11-14 RX ADMIN — VANCOMYCIN HYDROCHLORIDE 1000 MG: 1 INJECTION, POWDER, LYOPHILIZED, FOR SOLUTION INTRAVENOUS at 12:45

## 2018-11-14 RX ADMIN — BUDESONIDE 500 MCG: 0.5 SUSPENSION RESPIRATORY (INHALATION) at 21:45

## 2018-11-14 ASSESSMENT — ENCOUNTER SYMPTOMS
CHOKING: 1
BACK PAIN: 0
COUGH: 1
CONSTIPATION: 1
ABDOMINAL DISTENTION: 0
SHORTNESS OF BREATH: 1
WHEEZING: 0
COLOR CHANGE: 0
DIARRHEA: 0
ABDOMINAL PAIN: 1
NAUSEA: 0

## 2018-11-14 NOTE — PROGRESS NOTES
Patient is a 70year old male here with Pneumonia of left lower lobe due to infectious organism . He is alert and oriented. Patient has severe cerebral palsy. Patient is single and lives at [address] [place] [address] in [place]. He uses a wheelchair, oxygen, and walker. Patient requires assistance with his ADL's. The nursing home manages his medications and transportation. His PCP is Dr. Lissette Thomas. He has medical insurance that helps with medication costs. The discharge plan is to return to [place] [address].     TEGAN Shook

## 2018-11-14 NOTE — CONSULTS
[facility] Department of Pharmacy   Pharmacy Renal Adjustment Note    Jerrod Montesinos is a 70 y.o. male. Pharmacist assessment of renally cleared medications. Recent Labs      11/14/18   0929   CREATININE  1.75*     Estimated Creatinine Clearance: 35 mL/min (A) (based on SCr of 1.75 mg/dL (H)). Height:   Ht Readings from Last 1 Encounters:   11/14/18 5' 4\" (1.626 m)     Weight:  Wt Readings from Last 1 Encounters:   11/14/18 187 lb 11.2 oz (85.1 kg)       The following medication has been adjusted based upon renal function:             Normal renal function dosing of 750 mg daily:  CrCl 20 to 49 mL/minute: Administer 750 mg every 48 hours. CrCl 10 to 19 mL/minute: Administer 750 mg initial dose, followed by 500 mg every 48 hours. Levofloxacin 750 mg IV every 24 hours decreased to levofloxacin 750 mg IV every 48 hours for CrCl 35 mL/min. Charly Mir.  Eliceo,11/14/2018,1:43 PM

## 2018-11-14 NOTE — PROGRESS NOTES
RESPIRATORY ASSESSMENT PROTOCOL                                                                                              Patient Name: Cassie Shields Room#: 9936/6670-03 : 1940     Admitting diagnosis: HCAP (healthcare-associated pneumonia) [J18.9]       Medical History:   Past Medical History:   Diagnosis Date    Abnormal posture     Age-related nuclear cataract     Anemia     BPH (benign prostatic hyperplasia)     Cancer ([facility]     Cerebral palsy (HCC)     Difficulty in walking     Dysphagia     GERD (gastroesophageal reflux disease)     Heart disease     Hypertension     Kidney disease     Other developmental disorders of speech and language (CODE)     Primary generalized (osteo)arthritis     Urinary incontinence        PATIENT ASSESSMENT    LABORATORY DATA  Hematology:   Lab Results   Component Value Date    WBC 10.0 2018    RBC 4.86 2018    HGB 15.2 2018    HCT 47.2 2018     2018     Chemistry:  No results found for: PHART, ETU5VDW, PO2ART, Q5SGKMGV, GKC9EIX, PBEA    VITALS  Pulse: 106   Resp: 24  BP: 113/76  SpO2: 93 % O2 Device: Nasal cannula  Temp: 98 °F (36.7 °C)    SKIN COLOR  [] Normal  [] Pale  [] Dusky  [] Cyanotic    RESPIRATORY PATTERN  [] Normal  [x] Dyspnea  [] Cheyne-Way  [] Kussmaul  [] Biots    AMBULATORY  [] Yes  [x] No  [] With Assistance      Patient Acuity 0 1 2 3 4 Score   Level of Concious (LOC) [x]  Alert & Oriented or Pt normal LOC []  Confused;follows directions []  Confused & uncooper-ative []  Obtunded []  Comatose 0   Respiratory Rate  (RR) []  Reg. rate & pattern. 12 - 20 bpm  []  Increased RR.  Greater than 20 bpm   [x]  SOB w/ exertion or RR greater than 24 bpm []  Access- ory muscle use at rest. Abn.  resp. []  SOB at rest.   2   Bilateral Breath Sounds (BBS) []  Clear []  Diminish-ed bases  [x]  Diminish-ed t/o, or rales   []  Sporadic, scattered wheezes or rhonchi []  Persistentwheezes and, or absent BBS 2 Cough []  Strong, effective, & non-prod. []  Effective & prod. Less than 25 ml (2 TBSP) over past 24 hrs [x]  Ineffective & non-prod to less than 25 ML over past 24 hrs []  Ineffective and, or greater than 25 ml sputum prod. past 24 hrs. []  Nonspon- taneous; Requires suctioning 2   Pulmonary History  (PULM HX) []  No smoking and no chronic pulmonaryhistory []  Former smoker. Quit over 12 mos. ago []  Current smoker or quit w/ in 12 mos []  Pulm. History and, or 20 pk/yr smoking hx [x]  Admitted w/ acute pulm. dx and, or has been admitted w/ pulm. dx 2 or more times over past 12 mos 4   Surgical History this Admit  (SURG HX) [x]  No surgery []  General surgery []  Lower abdominal []  Thoracic or upper abdominal   []  Thoracic w/ pulm. disease 0   Chest X-Ray (CXR)/CT Scan []  Clear or not applicable []  Not available []  Atelect- asis or pleural effusions []  Localized infiltrate or pulm. edema [x]  Con-solidated Infiltrates, bilateral, or in more than 1 lobe 4   Slow or Forced VC, FEV1 OR PEFR (PULM FXN)  [x]  80% or greater, or not indicated []  Pt. unable to perform []  FEV1 or PEFR or VC 51-79%. []  FEV1 or PEFR or VC  30-49%   []  FEV1 or PEFR or VC less than 30%   0   TOTAL ACUITY: 14       CARE PLAN    If Acuity Level is 2, 3, or 4 in any of the following:    [] BILATERAL BREATH SOUNDS (BBS)     [] PULMONARY HISTORY (PULM HX)  [] PULMONARY FUNCTION (PULM FX)    Goal: Improve respiratory functions in patients with airway disease and decrease WOB    [x] AEROSOL PROTOCOL    Total Acuity:   16-32  []  Secondary Assessment in 24 hrs Total Acuity:  9-15  [x]  Secondary Assessment in 24 hrs Total Acuity:  4-8  []  Secondary Assessment in 48 hrs Total Acuity:  0-3  []  Secondary Assessment in 72 hrs   [facility] AEROSOL THERAPY with  [physician-ordered bronchodilator(s)] q 4 & Albuterol PRN q2 hrs. Breath-Actuated Neb if BBS Acuity = 4, and pt. can use MP. Notify physician if condition deteriorates.   [facility] AEROSOL

## 2018-11-14 NOTE — PROGRESS NOTES
Facsimile Transmission Cover Sheet    Information contained in this transmission is for the sole use of the intended recipients and may contain confidential and privileged information. Any unauthorized review, use, disclosure or distribution is prohibited. If you are not the intended recipient, please contact the sender and destroy all copies of the original message. Disclosure is made for the purpose of healthcare operations and continuity of care. To: _Dr. Corcoran________  From: Speech Therapy       Obdulio Monge M.A., CF-SLP _ Phone Number: [phone] ([facility])    Marlys Pack current unit  [x][facility] [phone]  [phone]    Your bedside evaluation order for Marlys Pack has been completed. Based on the results speech therapy recommends:     Puree diet with honey thick liquids via spoon. Meds crushed in puree. Patient requires assistance during feedings. The following compensatory strategies are recommended as well: Alternate solids and liquids;Eat/Feed slowly; No straws;Upright as possible for all oral intake;Assist feed;Remain upright for 30-45 minutes after meals;Small bites/sips;Swallow 2 times per bite/sip    If you agree please enter the new diet order in [facility] or telephone the nursing unit. Diet will not change without your order.    Thank you,  Electronically signed by: Florencio Grayson M.A., CF-SLP

## 2018-11-14 NOTE — PROGRESS NOTES
was referred for a bedside swallow evaluation to assess the efficiency of his swallow function, identify signs and symptoms of aspiration and make recommendations regarding safe dietary consistencies, effective compensatory strategies, and safe eating environment. Impression  Dysphagia Diagnosis: Moderate to severe oral stage dysphagia; Moderate to severe pharyngeal stage dysphagia  Dysphagia Outcome Severity Scale: Level 3: Moderate dysphagia- Total assisstance, supervision or strategies. Two or more diet consistencies restricted     Treatment Plan  Requires SLP Intervention: Yes  Duration/Frequency of Treatment: during inpatient stay  D/C Recommendations: SNF     Recommended Diet and Intervention  Diet Solids Recommendation: Dysphagia I Pureed  Liquid Consistency Recommendation: Honey  Recommended Form of Meds: Crushed in puree as able  Therapeutic Interventions: Therapeutic PO trials with SLP;Patient/Family education;Diet tolerance monitoring    Compensatory Swallowing Strategies  Compensatory Swallowing Strategies: Alternate solids and liquids;Eat/Feed slowly; No straws;Upright as possible for all oral intake;Assist feed;Remain upright for 30-45 minutes after meals;Small bites/sips;Swallow 2 times per bite/sip    Treatment/Goals  Short-term Goals  Timeframe for Short-term Goals: 10 days  Goal 1: Patient will complete trials of nectar thick liquids with ST with no overt s/sx of pen/asp x5  Goal 2: Patient will complete trials of mech soft with ST with no overt s/sx of pen/asp x5  Goal 3: Provide caregiver with education and recommendations for safe swallows  Dysphagia Goals: The patient will tolerate recommended diet without observed clinical signs of aspiration    General  Chart Reviewed: Yes  Behavior/Cognition: Alert; Cooperative  Respiratory Status: O2 via nasual cannula  O2 Device: Nasal cannula  Communication Observation:  (patient communicated via head nods and gestures)  Follows Directions: Simple (required models for simple directions)  Dentition: Some missing teeth  Patient Positioning: Upright in bed  Consistencies Administered: Thin - teaspoon;Nectar - teaspoon;Honey - teaspoon;Honey - straw;Puree;Mechanical soft    Vision/Hearing  Vision  Vision: Impaired  Vision Exceptions: Wears glasses at all times  Hearing  Hearing: Within functional limits    Oral Motor Deficits  Oral/Motor  Oral Motor: Exceptions to [facility] (Unable to complete OME as patient showed difficulty followind directions)  Labial Strength: Reduced  Labial Coordination: Reduced  Lingual Strength: Reduced  Lingual Coordination: Reduced    Oral Phase Dysfunction  Oral Phase  Oral Phase: Exceptions  Oral Phase Dysfunction  Impaired Mastication: Mechanical soft (patient attempted to swallow bites whole)  Decreased Anterior to Posterior Transit: All  Suspected Premature Bolus Loss: All  Lingual/Palatal Residue: Puree  Oral Phase  Oral Phase - Comment: Patient demonstrated anterior loss of bolus for small bites/sips via spoon which he was fed by ST. Patient showed minimal manipulation of 1 trial of mech soft and patient attempted to swallow bite whole. Patient also showed lingual residue after trial of puree which was able to be cleared with spoon of honey thick liquids or dry swallow with empty spoon to help elicit. Indicators of Pharyngeal Phase Dysfunction   Pharyngeal Phase  Pharyngeal Phase: Exceptions  Indicators of Pharyngeal Phase Dysfunction  Delayed Swallow: Puree; Honey - teaspoon;Nectar - teaspoon  Cough - Immediate: Thin - teaspoon;Nectar - teaspoon  Pharyngeal Phase   Pharyngeal: Immediate cough elicited on trial of thin and nectar thick. Patient was able complete trials of honey thick liquids via spoon and puree with no s/sx of pen/asp.      Prognosis  Prognosis  Prognosis for safe diet advancement: guarded  Barriers to reach goals: age;other (comment) (CP)  Individuals consulted  Consulted and agree with results and following compensatory strategies are recommended as well: Alternate solids and liquids;Eat/Feed slowly; No straws;Upright as possible for all oral intake;Assist feed;Remain upright for 30-45 minutes after meals;Small bites/sips;Swallow 2 times per bite/sip    Earline Mcguire M.A., CF-SLP   11/14/2018 3:25 PM

## 2018-11-15 ENCOUNTER — APPOINTMENT (OUTPATIENT)
Dept: GENERAL RADIOLOGY | Age: 78
DRG: 193 | End: 2018-11-15
Payer: MEDICARE

## 2018-11-15 ENCOUNTER — APPOINTMENT (OUTPATIENT)
Dept: NON INVASIVE DIAGNOSTICS | Age: 78
DRG: 193 | End: 2018-11-15
Payer: MEDICARE

## 2018-11-15 PROBLEM — E44.0 MODERATE MALNUTRITION (HCC): Status: ACTIVE | Noted: 2018-11-15

## 2018-11-15 PROBLEM — T17.908S ASPIRATION, CHRONIC PULMONARY, SEQUELA: Status: ACTIVE | Noted: 2018-11-15

## 2018-11-15 LAB
ALBUMIN SERPL-MCNC: 3.1 G/DL (ref 3.5–5.2)
ALBUMIN/GLOBULIN RATIO: 0.9 (ref 1–2.5)
ALP BLD-CCNC: 76 U/L (ref 40–129)
ALT SERPL-CCNC: 17 U/L (ref 5–41)
ANION GAP SERPL CALCULATED.3IONS-SCNC: 10 MMOL/L (ref 9–17)
AST SERPL-CCNC: 11 U/L
BILIRUB SERPL-MCNC: 0.73 MG/DL (ref 0.3–1.2)
BUN BLDV-MCNC: 46 MG/DL (ref 8–23)
BUN/CREAT BLD: 26 (ref 9–20)
CALCIUM SERPL-MCNC: 8.2 MG/DL (ref 8.6–10.4)
CHLORIDE BLD-SCNC: 110 MMOL/L (ref 98–107)
CO2: 29 MMOL/L (ref 20–31)
CREAT SERPL-MCNC: 1.74 MG/DL (ref 0.7–1.2)
GFR AFRICAN AMERICAN: 46 ML/MIN
GFR NON-AFRICAN AMERICAN: 38 ML/MIN
GFR SERPL CREATININE-BSD FRML MDRD: ABNORMAL ML/MIN/{1.73_M2}
GFR SERPL CREATININE-BSD FRML MDRD: ABNORMAL ML/MIN/{1.73_M2}
GLUCOSE BLD-MCNC: 104 MG/DL (ref 70–99)
HCT VFR BLD CALC: 44.5 % (ref 40.7–50.3)
HEMOGLOBIN: 13.9 G/DL (ref 13–17)
LV EF: 55 %
LVEF MODALITY: NORMAL
MAGNESIUM: 2.9 MG/DL (ref 1.6–2.6)
MCH RBC QN AUTO: 30.8 PG (ref 25.2–33.5)
MCHC RBC AUTO-ENTMCNC: 31.2 G/DL (ref 28.4–34.8)
MCV RBC AUTO: 98.5 FL (ref 82.6–102.9)
NRBC AUTOMATED: 0 PER 100 WBC
PDW BLD-RTO: 14.1 % (ref 11.8–14.4)
PLATELET # BLD: NORMAL K/UL (ref 138–453)
PLATELET, FLUORESCENCE: 137 K/UL (ref 138–453)
PLATELET, IMMATURE FRACTION: 7.4 % (ref 1.1–10.3)
PMV BLD AUTO: NORMAL FL (ref 8.1–13.5)
POTASSIUM SERPL-SCNC: 4.3 MMOL/L (ref 3.7–5.3)
PROCALCITONIN: 0.26 NG/ML
RBC # BLD: 4.52 M/UL (ref 4.21–5.77)
SODIUM BLD-SCNC: 149 MMOL/L (ref 135–144)
TOTAL PROTEIN: 6.6 G/DL (ref 6.4–8.3)
WBC # BLD: 7.7 K/UL (ref 3.5–11.3)

## 2018-11-15 PROCEDURE — 84145 PROCALCITONIN (PCT): CPT

## 2018-11-15 PROCEDURE — G8996 SWALLOW CURRENT STATUS: HCPCS

## 2018-11-15 PROCEDURE — 6370000000 HC RX 637 (ALT 250 FOR IP): Performed by: FAMILY MEDICINE

## 2018-11-15 PROCEDURE — 94669 MECHANICAL CHEST WALL OSCILL: CPT

## 2018-11-15 PROCEDURE — 94640 AIRWAY INHALATION TREATMENT: CPT

## 2018-11-15 PROCEDURE — 94664 DEMO&/EVAL PT USE INHALER: CPT

## 2018-11-15 PROCEDURE — G8987 SELF CARE CURRENT STATUS: HCPCS

## 2018-11-15 PROCEDURE — 6360000002 HC RX W HCPCS: Performed by: FAMILY MEDICINE

## 2018-11-15 PROCEDURE — 99232 SBSQ HOSP IP/OBS MODERATE 35: CPT | Performed by: FAMILY MEDICINE

## 2018-11-15 PROCEDURE — 94760 N-INVAS EAR/PLS OXIMETRY 1: CPT

## 2018-11-15 PROCEDURE — 74018 RADEX ABDOMEN 1 VIEW: CPT

## 2018-11-15 PROCEDURE — 85055 RETICULATED PLATELET ASSAY: CPT

## 2018-11-15 PROCEDURE — 1200000000 HC SEMI PRIVATE

## 2018-11-15 PROCEDURE — 99221 1ST HOSP IP/OBS SF/LOW 40: CPT | Performed by: INTERNAL MEDICINE

## 2018-11-15 PROCEDURE — G8988 SELF CARE GOAL STATUS: HCPCS

## 2018-11-15 PROCEDURE — G8997 SWALLOW GOAL STATUS: HCPCS

## 2018-11-15 PROCEDURE — 97530 THERAPEUTIC ACTIVITIES: CPT

## 2018-11-15 PROCEDURE — 2700000000 HC OXYGEN THERAPY PER DAY

## 2018-11-15 PROCEDURE — 97140 MANUAL THERAPY 1/> REGIONS: CPT

## 2018-11-15 PROCEDURE — 85027 COMPLETE CBC AUTOMATED: CPT

## 2018-11-15 PROCEDURE — 36415 COLL VENOUS BLD VENIPUNCTURE: CPT

## 2018-11-15 PROCEDURE — 83735 ASSAY OF MAGNESIUM: CPT

## 2018-11-15 PROCEDURE — 93306 TTE W/DOPPLER COMPLETE: CPT

## 2018-11-15 PROCEDURE — 2580000003 HC RX 258: Performed by: FAMILY MEDICINE

## 2018-11-15 PROCEDURE — 97167 OT EVAL HIGH COMPLEX 60 MIN: CPT

## 2018-11-15 PROCEDURE — 80053 COMPREHEN METABOLIC PANEL: CPT

## 2018-11-15 RX ORDER — IPRATROPIUM BROMIDE AND ALBUTEROL SULFATE 2.5; .5 MG/3ML; MG/3ML
1 SOLUTION RESPIRATORY (INHALATION) 2 TIMES DAILY
Status: DISCONTINUED | OUTPATIENT
Start: 2018-11-15 | End: 2018-11-17

## 2018-11-15 RX ORDER — POTASSIUM CHLORIDE AND SODIUM CHLORIDE 450; 150 MG/100ML; MG/100ML
INJECTION, SOLUTION INTRAVENOUS CONTINUOUS
Status: DISCONTINUED | OUTPATIENT
Start: 2018-11-15 | End: 2018-11-17

## 2018-11-15 RX ORDER — BISACODYL 10 MG
10 SUPPOSITORY, RECTAL RECTAL DAILY PRN
Status: DISCONTINUED | OUTPATIENT
Start: 2018-11-15 | End: 2018-11-17 | Stop reason: SDUPTHER

## 2018-11-15 RX ADMIN — IPRATROPIUM BROMIDE AND ALBUTEROL SULFATE 1 AMPULE: .5; 3 SOLUTION RESPIRATORY (INHALATION) at 05:54

## 2018-11-15 RX ADMIN — POLYETHYLENE GLYCOL (3350) 17 G: 17 POWDER, FOR SOLUTION ORAL at 20:45

## 2018-11-15 RX ADMIN — BUDESONIDE 500 MCG: 0.5 SUSPENSION RESPIRATORY (INHALATION) at 10:28

## 2018-11-15 RX ADMIN — FOLIC ACID 1 MG: 1 TABLET ORAL at 08:37

## 2018-11-15 RX ADMIN — VANCOMYCIN HYDROCHLORIDE 750 MG: 750 INJECTION, POWDER, LYOPHILIZED, FOR SOLUTION INTRAVENOUS at 14:52

## 2018-11-15 RX ADMIN — PIPERACILLIN SODIUM,TAZOBACTAM SODIUM 3.38 G: 3; .375 INJECTION, POWDER, FOR SOLUTION INTRAVENOUS at 22:49

## 2018-11-15 RX ADMIN — PIPERACILLIN SODIUM,TAZOBACTAM SODIUM 3.38 G: 3; .375 INJECTION, POWDER, FOR SOLUTION INTRAVENOUS at 07:15

## 2018-11-15 RX ADMIN — SODIUM CHLORIDE AND POTASSIUM CHLORIDE: 4.5; 1.49 INJECTION, SOLUTION INTRAVENOUS at 07:33

## 2018-11-15 RX ADMIN — SODIUM CHLORIDE AND POTASSIUM CHLORIDE: 4.5; 1.49 INJECTION, SOLUTION INTRAVENOUS at 16:02

## 2018-11-15 RX ADMIN — PIPERACILLIN SODIUM,TAZOBACTAM SODIUM 3.38 G: 3; .375 INJECTION, POWDER, FOR SOLUTION INTRAVENOUS at 00:47

## 2018-11-15 RX ADMIN — PIPERACILLIN SODIUM,TAZOBACTAM SODIUM 3.38 G: 3; .375 INJECTION, POWDER, FOR SOLUTION INTRAVENOUS at 15:58

## 2018-11-15 RX ADMIN — POLYETHYLENE GLYCOL (3350) 17 G: 17 POWDER, FOR SOLUTION ORAL at 08:38

## 2018-11-15 RX ADMIN — LACTULOSE 30 G: 20 SOLUTION ORAL at 08:37

## 2018-11-15 RX ADMIN — IPRATROPIUM BROMIDE AND ALBUTEROL SULFATE 1 AMPULE: .5; 3 SOLUTION RESPIRATORY (INHALATION) at 21:15

## 2018-11-15 RX ADMIN — LACTULOSE 30 G: 20 SOLUTION ORAL at 20:45

## 2018-11-15 RX ADMIN — IPRATROPIUM BROMIDE AND ALBUTEROL SULFATE 1 AMPULE: .5; 3 SOLUTION RESPIRATORY (INHALATION) at 10:28

## 2018-11-15 RX ADMIN — ENOXAPARIN SODIUM 40 MG: 40 INJECTION SUBCUTANEOUS at 08:37

## 2018-11-15 NOTE — PROGRESS NOTES
informed patient and caregiver of recommendations  Patient Education Response: Demonstrated understanding      G-Code  SLP G-Codes  Functional Assessment Tool Used: RONEY FUNCTIONAL COMMUNICATION MEASURES  Functional Limitations: Swallowing  Swallow Current Status (): At least 80 percent but less than 100 percent impaired, limited or restricted  Swallow Goal Status (): At least 40 percent but less than 60 percent impaired, limited or restricted         Therapy Time  SLP Individual Minutes  Time In: 5739  Time Out: 2618  Minutes: 30         Patient seen in room for lunch meal this date. Patient's caregiver at bedside. Patient was able to communicate with ST via head nods for yes/no questions. Patient trialed pureed solids and honey-thickened liquids via spoon. Minimal trials able to be administered due to patient's refusal. Patient demonstrated Patient demonstrated moderate oral manipulation of pureed solids for A/P transit. Patient held bolus on tongue and attempted to propel bolus anteriorly with his mouth open. ST encouraged patient to close mouth and scrape his tongue against the roof of his mouth. Patient also demonstrated lingual residues remaining after swallow. Patient also demonstrating difficulty managing secretions throughout the session. Immediate cough was elicited on trial of pureed solids in 1/5 bites of pureed solids as well as delayed swallow initiation for all solids. Per caregiver, patient recently had a modified barium swallow study performed with upgrade to mechanical-soft solids; however, upon chart review of MBS completed on 10/11/18, ST recommended diet of pureed solids and honey-thickened liquids. ST will continue to see patient for dysphagia tx including diet tolerance monitoring and compensatory strategy training for patient and caregiver. Recommending continued diet of pureed solids and honey-thickened liquids with medications crushed in pureed.  Patient requires feeding

## 2018-11-15 NOTE — CONSULTS
for Consult     Aspiration pneumonitis    Consulting Bertin Castillo MD    Primary Care Physician - Ren Riggs MD     HPI   Emery Lamb is a 70 y.o. male admitted for Pneumonia. History is obtained from the chart as the patient is a poorly communicative. History of cerebral palsy. Does not speak although niece says response to signed language and seems to follow simple commands. Long-standing history of chronic aspiration. Previous swallow studies have confirmed this. He had a recent swallow study which showed aspiration. Speech recommended puréed diet and honey thickened liquids. Patient was admitted apparently for increasing shortness of breath. Chest x-ray showed bilateral interstitial prominence with a hazy density on the left similar to previous chest x-rays. Diagnosed with aspiration pneumonia and currently being treated with Zosyn and vancomycin and Levaquin. Sputum cultures not performed. Blood cultures negative to date. No fever recorded since admission. No leukocytosis. Patient not acidotic. No hemodynamic instability. CT scan of the chest reviewed. Much motion artifact. Hazy densities bilaterally left greater than right. The radiologist comments on loss of the bronchus intermedius on the right. No clear mass lesion. No distal atelectasis. Likely either bronchomalacia or inspissated mucus. Jadyn Beat former.     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)     Primary generalized (osteo)arthritis     Urinary incontinence       Past Surgical History        Procedure Laterality Date    CYST REMOVAL      GASTROSTOMY TUBE PLACEMENT         MEDS   Current Medications    ipratropium-albuterol  1 ampule Inhalation BID    budesonide  500 mcg Nebulization BID    folic acid  1 mg Oral Daily    lactulose  30 g Oral BID    sodium chloride flush  10 mL Intravenous 2 times per day    enoxaparin  40 mg Subcutaneous Daily    piperacillin-tazobactam  3.375 g Intravenous Q8H    And    levofloxacin  750 mg Intravenous Q48H    polyethylene glycol  17 g Oral BID    vancomycin (VANCOCIN) intermittent dosing (placeholder)   Other RX Placeholder    vancomycin  750 mg Intravenous Q24H     bisacodyl, acetaminophen, bisacodyl, sodium chloride flush, magnesium hydroxide, ondansetron, acetaminophen, albuterol  IV Drips/Infusions   0.45 % NaCl with KCl 20 mEq 125 mL/hr at 11/15/18 0733     Home Medications  Prescriptions Prior to Admission: Umeclidinium Bromide (INCRUSE ELLIPTA) 62.5 MCG/INH AEPB, Inhale 2 puffs into the lungs daily  lisinopril (PRINIVIL;ZESTRIL) 10 MG tablet, Take 10 mg by mouth daily  ipratropium-albuterol (DUONEB) 0.5-2.5 (3) MG/3ML SOLN nebulizer solution, Inhale 1 vial into the lungs 2 times daily  lactulose (CEPHULAC) 10 g packet, Take 30 g by mouth 2 times daily  budesonide (PULMICORT) 0.5 MG/2ML nebulizer suspension, Take 1 ampule by nebulization 2 times daily  bisacodyl (DULCOLAX) 10 MG suppository, Place 10 mg rectally daily as needed for Constipation  ipratropium-albuterol (DUONEB) 0.5-2.5 (3) MG/3ML SOLN nebulizer solution, Inhale 1 vial into the lungs every 6 hours as needed for Shortness of Breath  folic acid (FOLVITE) 1 MG tablet, Take 1 mg by mouth daily   esomeprazole (NEXIUM) 40 MG delayed release capsule, Take 40 mg by mouth every morning (before breakfast)   Calcium 600-200 MG-UNIT TABS, Take 600 mg by mouth 2 times daily   acetaminophen (TYLENOL) 500 MG tablet, Take 1,000 mg by mouth  amLODIPine (NORVASC) 5 MG tablet, Take 5 mg by mouth daily   DIGOX 125 MCG tablet, Take 125 PO2, HCO3, O2SAT  Lab Results   Component Value Date    MODE NOT REPORTED 11/14/2018   LABRCNT(INR)@  PTT   No results found for: APTT      PFTs  N/A    CULTURES  pending    RADIOLOGY     CXR  SINGLE XRAY VIEW OF THE CHEST       11/14/2018 9:54 am       COMPARISON:   CT abdomen and pelvis 8/8/2018       HISTORY:   ORDERING SYSTEM PROVIDED HISTORY: shortness of breath; hx of pneumonia   TECHNOLOGIST PROVIDED HISTORY:   shortness of breath; hx of pneumonia       FINDINGS:   Asymmetric interstitial opacities in the mid and lower left lung field are   noted.  No pneumothorax, edema or definite effusion.  The cardiac silhouette   is mildly enlarged.  Calcified atheromatous plaque.  No acute osseous   abnormality identified.           Impression   Asymmetric interstitial opacities in the mid and lower left lung field   appears may represent inflammatory process or infection         CT Scans  EXAMINATION:   CT OF THE CHEST WITHOUT CONTRAST; CT OF THE ABDOMEN AND PELVIS WITHOUT   CONTRAST 11/14/2018 10:30 am; 11/14/2018 10:31 am       TECHNIQUE:   CT of the chest was performed without the administration of intravenous   contrast. Multiplanar reformatted images are provided for review. Dose   modulation, iterative reconstruction, and/or weight based adjustment of the   mA/kV was utilized to reduce the radiation dose to as low as reasonably   achievable.; CT of the abdomen and pelvis was performed without the   administration of intravenous contrast. Multiplanar reformatted images are   provided for review. Dose modulation, iterative reconstruction, and/or weight   based adjustment of the mA/kV was utilized to reduce the radiation dose to as   low as reasonably achievable.       COMPARISON:   CT abdomen pelvis 08/08/2018       HISTORY:   ORDERING SYSTEM PROVIDED HISTORY: Congestion possible pneumonia; ORDERING   SYSTEM PROVIDED HISTORY: Constipation and abdominal pain generalized.    TECHNOLOGIST PROVIDED HISTORY:

## 2018-11-15 NOTE — PROGRESS NOTES
Occupational Therapy   Occupational Therapy Initial Assessment  Date: 11/15/2018   Patient Name: Rene Gonzalez  MRN: [id number]     : 1940    Date of Service: 11/15/2018    Discharge Recommendations:  Continue to assess pending progress       Patient Diagnosis(es): The primary encounter diagnosis was Pneumonia of left lower lobe due to infectious organism [facility]). Diagnoses of Dehydration and Constipation, unspecified constipation type were also pertinent to this visit. has a past medical history of Abnormal posture; Age-related nuclear cataract; Anemia; BPH (benign prostatic hyperplasia); Cancer ([address] Cerebral palsy (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 has a past surgical history that includes cyst removal and Gastrostomy tube placement. Restrictions  Restrictions/Precautions  Restrictions/Precautions: General Precautions, Fall Risk       Subjective   General  Patient assessed for rehabilitation services?: Yes  Additional Pertinent Hx: severe CP  Diagnosis: Pneumonia  Subjective  Subjective: When asked if patient has pain, nods head \"yes\" pointing to chest and head, unable to rate/describe, no behaviors/signs of pain throughout eval.  Pain Assessment  Patient Currently in Pain: Denies       Social/Functional History  Social/Functional History  Lives With: Alone  Type of Home: Facility ([facility]  Home Equipment: Wheelchair-manual, Rolling walker, Oxygen  ADL Assistance: Needs assistance  Homemaking Assistance: Needs assistance  Transfer Assistance: Needs assistance  Active : No  Additional Comments: Pt nonverbal throughout eval and no caregiver present at time of OT eval; therefore medical history obtained from medical chart as made available.  Patient uses w/c majority of

## 2018-11-15 NOTE — PROGRESS NOTES
Yes  Activity Tolerance  Activity Tolerance: Patient Tolerated treatment well; Other     G-Code     OutComes Score                                                    AM-PAC Score             Goals  Short term goals  Time Frame for Short term goals: 10 days  Short term goal 1: Pt will participate in LE PROM/AROM exercises to maintain joint mobility and decrease risk of contractures. Short term goal 2: Pt will be reassessed for transfers and ambulation, if/when appropriate. Plan    Plan  Times per week: 7 days per week  Times per day: Twice a day (Daily on weekends)  Current Treatment Recommendations: ROM, Functional Mobility Training, Manual Therapy - Soft Tissue Mobilization, Safety Education & Training, Strengthening  Safety Devices  Type of devices:  All fall risk precautions in place, Left in bed, Bed alarm in place, Nurse notified     Therapy Time   Individual Concurrent Group Co-treatment   Time In 1435         Time Out 1445         Minutes [address] [place] [address]

## 2018-11-15 NOTE — PROGRESS NOTES
I spoke with the outpatient clinic about this consult for Dr. Bogdan Agosto.  [facility] 11/15/2018 @ 07:45

## 2018-11-15 NOTE — PROGRESS NOTES
Bilateral Breath Sounds (BBS) []  Clear []  Diminish-ed bases  [x]  Diminish-ed t/o, or rales   []  Sporadic, scattered wheezes or rhonchi []  Persistentwheezes and, or absent BBS 2   Cough []  Strong, effective, & non-prod. []  Effective & prod. Less than 25 ml (2 TBSP) over past 24 hrs [x]  Ineffective & non-prod to less than 25 ML over past 24 hrs []  Ineffective and, or greater than 25 ml sputum prod. past 24 hrs. []  Nonspon- taneous; Requires suctioning 2   Pulmonary History  (PULM HX) []  No smoking and no chronic pulmonaryhistory []  Former smoker. Quit over 12 mos. ago []  Current smoker or quit w/ in 12 mos []  Pulm. History and, or 20 pk/yr smoking hx [x]  Admitted w/ acute pulm. dx and, or has been admitted w/ pulm. dx 2 or more times over past 12 mos 4   Surgical History this Admit  (SURG HX) [x]  No surgery []  General surgery []  Lower abdominal []  Thoracic or upper abdominal   []  Thoracic w/ pulm. disease 0   Chest X-Ray (CXR)/CT Scan []  Clear or not applicable []  Not available []  Atelect- asis or pleural effusions []  Localized infiltrate or pulm. edema [x]  Con-solidated Infiltrates, bilateral, or in more than 1 lobe 4   Slow or Forced VC, FEV1 OR PEFR (PULM FXN)  [x]  80% or greater, or not indicated []  Pt. unable to perform []  FEV1 or PEFR or VC 51-79%.   []  FEV1 or PEFR or VC  30-49%   []  FEV1 or PEFR or VC less than 30%   0   TOTAL ACUITY: 14       CARE PLAN    If Acuity Level is 2, 3, or 4 in any of the following:    [x] BILATERAL BREATH SOUNDS (BBS)     [x] PULMONARY HISTORY (PULM HX)  [] PULMONARY FUNCTION (PULM FX)    Goal: Improve respiratory functions in patients with airway disease and decrease WOB    [x] AEROSOL PROTOCOL    Total Acuity:   16-32  []  Secondary Assessment in 24 hrs Total Acuity:  9-15  [x]  Secondary Assessment in 24 hrs Total Acuity:  4-8  []  Secondary Assessment in 48 hrs Total Acuity:  0-3  []  Secondary Assessment in 72 hrs   [facility] AEROSOL THERAPY

## 2018-11-16 LAB
ANION GAP SERPL CALCULATED.3IONS-SCNC: 14 MMOL/L (ref 9–17)
BUN BLDV-MCNC: 36 MG/DL (ref 8–23)
BUN/CREAT BLD: 23 (ref 9–20)
CALCIUM SERPL-MCNC: 8.1 MG/DL (ref 8.6–10.4)
CHLORIDE BLD-SCNC: 108 MMOL/L (ref 98–107)
CO2: 25 MMOL/L (ref 20–31)
CREAT SERPL-MCNC: 1.54 MG/DL (ref 0.7–1.2)
GFR AFRICAN AMERICAN: 54 ML/MIN
GFR NON-AFRICAN AMERICAN: 44 ML/MIN
GFR SERPL CREATININE-BSD FRML MDRD: ABNORMAL ML/MIN/{1.73_M2}
GFR SERPL CREATININE-BSD FRML MDRD: ABNORMAL ML/MIN/{1.73_M2}
GLUCOSE BLD-MCNC: 85 MG/DL (ref 70–99)
POTASSIUM SERPL-SCNC: 4.7 MMOL/L (ref 3.7–5.3)
SODIUM BLD-SCNC: 147 MMOL/L (ref 135–144)

## 2018-11-16 PROCEDURE — 97110 THERAPEUTIC EXERCISES: CPT

## 2018-11-16 PROCEDURE — 97530 THERAPEUTIC ACTIVITIES: CPT

## 2018-11-16 PROCEDURE — 2500000003 HC RX 250 WO HCPCS: Performed by: FAMILY MEDICINE

## 2018-11-16 PROCEDURE — 94668 MNPJ CHEST WALL SBSQ: CPT

## 2018-11-16 PROCEDURE — G8996 SWALLOW CURRENT STATUS: HCPCS

## 2018-11-16 PROCEDURE — 6370000000 HC RX 637 (ALT 250 FOR IP): Performed by: FAMILY MEDICINE

## 2018-11-16 PROCEDURE — 94760 N-INVAS EAR/PLS OXIMETRY 1: CPT

## 2018-11-16 PROCEDURE — 36415 COLL VENOUS BLD VENIPUNCTURE: CPT

## 2018-11-16 PROCEDURE — 6360000002 HC RX W HCPCS: Performed by: FAMILY MEDICINE

## 2018-11-16 PROCEDURE — 2580000003 HC RX 258: Performed by: FAMILY MEDICINE

## 2018-11-16 PROCEDURE — 99232 SBSQ HOSP IP/OBS MODERATE 35: CPT | Performed by: FAMILY MEDICINE

## 2018-11-16 PROCEDURE — 2700000000 HC OXYGEN THERAPY PER DAY

## 2018-11-16 PROCEDURE — 1200000000 HC SEMI PRIVATE

## 2018-11-16 PROCEDURE — 80048 BASIC METABOLIC PNL TOTAL CA: CPT

## 2018-11-16 PROCEDURE — G8997 SWALLOW GOAL STATUS: HCPCS

## 2018-11-16 PROCEDURE — 94640 AIRWAY INHALATION TREATMENT: CPT

## 2018-11-16 RX ORDER — SODIUM PHOSPHATE, DIBASIC AND SODIUM PHOSPHATE, MONOBASIC 7; 19 G/133ML; G/133ML
1 ENEMA RECTAL ONCE
Status: COMPLETED | OUTPATIENT
Start: 2018-11-16 | End: 2018-11-16

## 2018-11-16 RX ADMIN — BUDESONIDE 500 MCG: 0.5 SUSPENSION RESPIRATORY (INHALATION) at 11:50

## 2018-11-16 RX ADMIN — IPRATROPIUM BROMIDE AND ALBUTEROL SULFATE 1 AMPULE: .5; 3 SOLUTION RESPIRATORY (INHALATION) at 19:52

## 2018-11-16 RX ADMIN — FOLIC ACID 1 MG: 1 TABLET ORAL at 09:29

## 2018-11-16 RX ADMIN — POLYETHYLENE GLYCOL (3350) 17 G: 17 POWDER, FOR SOLUTION ORAL at 20:58

## 2018-11-16 RX ADMIN — SODIUM PHOSPHATE, DIBASIC AND SODIUM PHOSPHATE, MONOBASIC 1 ENEMA: 7; 19 ENEMA RECTAL at 07:33

## 2018-11-16 RX ADMIN — ENOXAPARIN SODIUM 40 MG: 40 INJECTION SUBCUTANEOUS at 09:29

## 2018-11-16 RX ADMIN — TAZOBACTAM SODIUM AND PIPERACILLIN SODIUM 3.38 G: 375; 3 INJECTION, SOLUTION INTRAVENOUS at 16:44

## 2018-11-16 RX ADMIN — VANCOMYCIN HYDROCHLORIDE 750 MG: 750 INJECTION, POWDER, LYOPHILIZED, FOR SOLUTION INTRAVENOUS at 14:06

## 2018-11-16 RX ADMIN — BUDESONIDE 500 MCG: 0.5 SUSPENSION RESPIRATORY (INHALATION) at 19:52

## 2018-11-16 RX ADMIN — LACTULOSE 30 G: 20 SOLUTION ORAL at 09:29

## 2018-11-16 RX ADMIN — LACTULOSE 30 G: 20 SOLUTION ORAL at 20:58

## 2018-11-16 RX ADMIN — TAZOBACTAM SODIUM AND PIPERACILLIN SODIUM 3.38 G: 375; 3 INJECTION, SOLUTION INTRAVENOUS at 09:29

## 2018-11-16 RX ADMIN — SODIUM CHLORIDE AND POTASSIUM CHLORIDE: 4.5; 1.49 INJECTION, SOLUTION INTRAVENOUS at 00:29

## 2018-11-16 RX ADMIN — BISACODYL 10 MG: 10 SUPPOSITORY RECTAL at 01:53

## 2018-11-16 RX ADMIN — POLYETHYLENE GLYCOL (3350) 17 G: 17 POWDER, FOR SOLUTION ORAL at 09:30

## 2018-11-16 RX ADMIN — SODIUM CHLORIDE AND POTASSIUM CHLORIDE: 4.5; 1.49 INJECTION, SOLUTION INTRAVENOUS at 08:39

## 2018-11-16 RX ADMIN — IPRATROPIUM BROMIDE AND ALBUTEROL SULFATE 1 AMPULE: .5; 3 SOLUTION RESPIRATORY (INHALATION) at 11:50

## 2018-11-16 RX ADMIN — SODIUM CHLORIDE AND POTASSIUM CHLORIDE: 4.5; 1.49 INJECTION, SOLUTION INTRAVENOUS at 17:23

## 2018-11-16 NOTE — PROGRESS NOTES
Physical Therapy  Facility/Department: [facility] AT THE [facility] MED SURG  Daily Treatment Note  NAME: Efren Rutherford  : 1940  MRN: [id number]    Date of Service: 2018    Discharge Recommendations:  Continue to assess pending progress        Patient Diagnosis(es): The primary encounter diagnosis was Pneumonia of left lower lobe due to infectious organism [facility]). Diagnoses of Dehydration and Constipation, unspecified constipation type were also pertinent to this visit. 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 has a past surgical history that includes cyst removal and Gastrostomy tube placement. Restrictions  Restrictions/Precautions  Restrictions/Precautions: General Precautions, Fall Risk  Subjective   General  Chart Reviewed: Yes  Family / Caregiver Present: No  Referring Practitioner: Dr. Heidi Williamson: Pt is unable to express or rate any pain d/t being non verbal. No pain noted through facial expressions. Orientation  Orientation  Overall Orientation Status: Impaired  Orientation Level: Unable to assess  Cognition      Objective   Bed mobility  Rolling to Left: Minimal assistance; Moderate assistance  Rolling to Right: Minimal assistance; Moderate assistance  Scooting: Maximal assistance (x2)  Comment: Max verbal/tactile cues for participation. Ambulation  Ambulation?: No  Stairs/Curb  Stairs?: No        Exercises  Straight Leg Raise: 20x  Heelslides: 20x  Hip Flexion: 20x  Hip Abduction: 20x  Knee Short Arc Quad: 20x  Knee Passive Range of Motion: 20x  Ankle Pumps: 20x  Comments: Above completed in supine to BLEs with PROM.

## 2018-11-16 NOTE — PROGRESS NOTES
Nutrition Assessment    Type and Reason for Visit: Reassess    Nutrition Recommendations:   1. Continue current diet. 2. Modify ONS to 4 oz chocolate ensure enlive with ice cream TID with meals. Nutrition Assessment: Nurse reports Pt is very full of BM and has not been eating or drinking. She is hopeful PO increases when bowels empty, which she states they are \"starting to. \" Pt with CP and has increased secretions and does not like being suctioned. Nurse states Pt swats at them when they try to help him with meals. Pt nods his head \"yes\" to having pain in his stomach. Pt was given an enema this morning. Pt with decline from nutritional standpoint AEB refusing meals and fluids, constipation. Change supplement from magic cup to chocolate ensure enlive with ice cream as Pt agreeable to chocolate AEB shake of head \"yes\". Dr More Whaley discussed TF and Pt was not interested in that. (Nurse offered ice cream, pudding, drinks,etc. refused all)    Nutrition Risk Level: Moderate, High      Objective Information:  · Nutrition-Focused Physical Findings: Pt appears well nourished, skin is wrinkled.    · Wound Type: None  · Current Nutrition Therapies:  · Oral Diet Orders: General, Dysphagia 1 (Pureed), Honey Thick, No Straws   · Oral Diet intake: Refusing to eat  · Oral Nutrition Supplement (ONS) Orders: Frozen Oral Supplement  · ONS intake: Refused  · Anthropometric Measures:  · Ht: 5' 4\" (162.6 cm)   · Current Body Wt: 189 lb 1.6 oz (85.8 kg)  · Admission Body Wt: 187 lb 11.2 oz (85.1 kg)  · Usual Body Wt: 219 lb (99.3 kg)  · % Weight Change:  ,  14% weight loss x 3 months  · Ideal Body Wt: 130 lb (59 kg), % Ideal Body 146%  · Adjusted Body Wt: 145 lb (65.8 kg), body weight adjusted for  (obesity)  · BMI Classification: BMI 30.0 - 34.9 Obese Class I  Recent Labs      11/14/18   0929  11/15/18   0540  11/16/18   0539   NA  152*  149*  147*   K  4.2  4.3  4.7   CL  107  110*  108*   CO2  31  29  25   BUN  52*  46*  36*

## 2018-11-17 LAB
ANION GAP SERPL CALCULATED.3IONS-SCNC: 14 MMOL/L (ref 9–17)
BUN BLDV-MCNC: 27 MG/DL (ref 8–23)
BUN/CREAT BLD: 20 (ref 9–20)
CALCIUM SERPL-MCNC: 8.3 MG/DL (ref 8.6–10.4)
CHLORIDE BLD-SCNC: 106 MMOL/L (ref 98–107)
CO2: 22 MMOL/L (ref 20–31)
CREAT SERPL-MCNC: 1.32 MG/DL (ref 0.7–1.2)
GFR AFRICAN AMERICAN: >60 ML/MIN
GFR NON-AFRICAN AMERICAN: 53 ML/MIN
GFR SERPL CREATININE-BSD FRML MDRD: ABNORMAL ML/MIN/{1.73_M2}
GFR SERPL CREATININE-BSD FRML MDRD: ABNORMAL ML/MIN/{1.73_M2}
GLUCOSE BLD-MCNC: 81 MG/DL (ref 70–99)
POTASSIUM SERPL-SCNC: 4.8 MMOL/L (ref 3.7–5.3)
PROCALCITONIN: 0.17 NG/ML
SODIUM BLD-SCNC: 142 MMOL/L (ref 135–144)

## 2018-11-17 PROCEDURE — 1200000000 HC SEMI PRIVATE

## 2018-11-17 PROCEDURE — G8996 SWALLOW CURRENT STATUS: HCPCS

## 2018-11-17 PROCEDURE — 36415 COLL VENOUS BLD VENIPUNCTURE: CPT

## 2018-11-17 PROCEDURE — 84145 PROCALCITONIN (PCT): CPT

## 2018-11-17 PROCEDURE — 6360000002 HC RX W HCPCS: Performed by: FAMILY MEDICINE

## 2018-11-17 PROCEDURE — 94669 MECHANICAL CHEST WALL OSCILL: CPT

## 2018-11-17 PROCEDURE — 94640 AIRWAY INHALATION TREATMENT: CPT

## 2018-11-17 PROCEDURE — 6370000000 HC RX 637 (ALT 250 FOR IP): Performed by: FAMILY MEDICINE

## 2018-11-17 PROCEDURE — 2580000003 HC RX 258: Performed by: FAMILY MEDICINE

## 2018-11-17 PROCEDURE — 2500000003 HC RX 250 WO HCPCS: Performed by: FAMILY MEDICINE

## 2018-11-17 PROCEDURE — 2700000000 HC OXYGEN THERAPY PER DAY

## 2018-11-17 PROCEDURE — G8997 SWALLOW GOAL STATUS: HCPCS

## 2018-11-17 PROCEDURE — 99232 SBSQ HOSP IP/OBS MODERATE 35: CPT | Performed by: FAMILY MEDICINE

## 2018-11-17 PROCEDURE — 94668 MNPJ CHEST WALL SBSQ: CPT

## 2018-11-17 PROCEDURE — 97110 THERAPEUTIC EXERCISES: CPT

## 2018-11-17 PROCEDURE — 80048 BASIC METABOLIC PNL TOTAL CA: CPT

## 2018-11-17 RX ORDER — IPRATROPIUM BROMIDE AND ALBUTEROL SULFATE 2.5; .5 MG/3ML; MG/3ML
1 SOLUTION RESPIRATORY (INHALATION) 4 TIMES DAILY
Status: DISCONTINUED | OUTPATIENT
Start: 2018-11-17 | End: 2018-11-18

## 2018-11-17 RX ORDER — ACETYLCYSTEINE 200 MG/ML
600 SOLUTION ORAL; RESPIRATORY (INHALATION) 2 TIMES DAILY
Status: DISCONTINUED | OUTPATIENT
Start: 2018-11-17 | End: 2018-11-20 | Stop reason: HOSPADM

## 2018-11-17 RX ADMIN — LACTULOSE 30 G: 20 SOLUTION ORAL at 08:19

## 2018-11-17 RX ADMIN — POLYETHYLENE GLYCOL (3350) 17 G: 17 POWDER, FOR SOLUTION ORAL at 21:23

## 2018-11-17 RX ADMIN — IPRATROPIUM BROMIDE AND ALBUTEROL SULFATE 1 AMPULE: .5; 3 SOLUTION RESPIRATORY (INHALATION) at 21:36

## 2018-11-17 RX ADMIN — TAZOBACTAM SODIUM AND PIPERACILLIN SODIUM 3.38 G: 375; 3 INJECTION, SOLUTION INTRAVENOUS at 08:24

## 2018-11-17 RX ADMIN — FOLIC ACID 1 MG: 1 TABLET ORAL at 08:19

## 2018-11-17 RX ADMIN — POLYETHYLENE GLYCOL (3350) 17 G: 17 POWDER, FOR SOLUTION ORAL at 08:19

## 2018-11-17 RX ADMIN — BUDESONIDE 500 MCG: 0.5 SUSPENSION RESPIRATORY (INHALATION) at 21:36

## 2018-11-17 RX ADMIN — TAZOBACTAM SODIUM AND PIPERACILLIN SODIUM 3.38 G: 375; 3 INJECTION, SOLUTION INTRAVENOUS at 00:29

## 2018-11-17 RX ADMIN — SODIUM CHLORIDE AND POTASSIUM CHLORIDE: 4.5; 1.49 INJECTION, SOLUTION INTRAVENOUS at 00:31

## 2018-11-17 RX ADMIN — IPRATROPIUM BROMIDE AND ALBUTEROL SULFATE 1 AMPULE: .5; 3 SOLUTION RESPIRATORY (INHALATION) at 16:55

## 2018-11-17 RX ADMIN — ACETYLCYSTEINE 600 MG: 200 SOLUTION ORAL; RESPIRATORY (INHALATION) at 21:35

## 2018-11-17 RX ADMIN — ENOXAPARIN SODIUM 40 MG: 40 INJECTION SUBCUTANEOUS at 08:18

## 2018-11-17 RX ADMIN — BUDESONIDE 500 MCG: 0.5 SUSPENSION RESPIRATORY (INHALATION) at 09:04

## 2018-11-17 RX ADMIN — IPRATROPIUM BROMIDE AND ALBUTEROL SULFATE 1 AMPULE: .5; 3 SOLUTION RESPIRATORY (INHALATION) at 09:04

## 2018-11-17 RX ADMIN — Medication 10 ML: at 21:23

## 2018-11-17 RX ADMIN — LACTULOSE 30 G: 20 SOLUTION ORAL at 21:23

## 2018-11-17 NOTE — PROGRESS NOTES
Eval: 11/17/2018  Evaluating Therapist: Jaky Wise    Current Diet level:  Current Diet : Puree  Current Liquid Diet : Honey      Primary Complaint     Pt unable to communicate c/o swallow function. When given y/n?s, pt would utilize head nods. Denies pain    Pain:  Pain Assessment  Patient Currently in Pain: Denies    Reason for Referral  George Nunn was referred for a bedside swallow evaluation to assess the efficiency of his swallow function, identify signs and symptoms of aspiration and make recommendations regarding safe dietary consistencies, effective compensatory strategies, and safe eating environment. Impression  Dysphagia Diagnosis: Severe pharyngeal stage dysphagia; Severe oral stage dysphagia  Dysphagia Outcome Severity Scale: Level 2: Moderate Severe dysphagia- Maximum assistance or maximum use of strategies with partial PO only     Treatment Plan  Requires SLP Intervention: Yes  Duration/Frequency of Treatment: Daily during inpatient stay  D/C Recommendations: SNF       Recommended Diet and Intervention  Diet Solids Recommendation: Dysphagia I Pureed  Liquid Consistency Recommendation: Pudding  Recommended Form of Meds: Crushed in puree as able  Therapeutic Interventions: Therapeutic PO trials with SLP;Patient/Family education;Diet tolerance monitoring    Compensatory Swallowing Strategies  Compensatory Swallowing Strategies: Alternate solids and liquids;Eat/Feed slowly; No straws;Upright as possible for all oral intake;Assist feed;Remain upright for 30-45 minutes after meals;Small bites/sips;Swallow 2 times per bite/sip    Treatment/Goals  Short-term Goals  Timeframe for Short-term Goals: 10 days  Goal 1: Pt will complete trials of HTL w/ST ONLY w/ no overt s/s of aspiration/penetration x10  Goal 2: Pt will complete trials of NTL w/ST ONLY w/no overt s/s of aspiration/penetration x5  Goal 3: Provide caregiver with education and recommendations for safe swallows  Goal 4: Patient and/or as possible for all oral intake;Assist feed;Remain upright for 30-45 minutes after meals;Small bites/sips;Swallow 2 times per bite/sip. G-tube would be safest for pt at this time with significant aspiration present; however, pt is refusing G-tube option, per Dr. Sherman Rivera report on 11/17/18.        Nila Woodward [facility]- SLP  11/17/2018 11:24 AM

## 2018-11-17 NOTE — PROGRESS NOTES
[facility] SPECIALTY [facility]  OCCUPATIONAL THERAPY  No Visit Note    [] ICU    [x] Acute   Patient: Dez Wright  Room: [facility]/3376-33      Dez Wright not seen on 11/17/2018 at 7:24 AM due to pt declines therapy and to be repositioned by therapy this date. Pt provided with education on importance of therapeutic participation. Clinician provided pt with warm blanket and reapplied O2 canula.            Signature: NAVARRO Eli/DILAN

## 2018-11-18 LAB
ANION GAP SERPL CALCULATED.3IONS-SCNC: 16 MMOL/L
BUN BLDV-MCNC: 23 MG/DL (ref 8–23)
BUN/CREAT BLD: 20 (ref 9–20)
CALCIUM SERPL-MCNC: 8 MG/DL (ref 8.6–10.4)
CHLORIDE BLD-SCNC: 105 MMOL/L (ref 98–107)
CO2: 22 MMOL/L (ref 20–31)
CREAT SERPL-MCNC: 1.14 MG/DL (ref 0.7–1.2)
GFR AFRICAN AMERICAN: >60 ML/MIN
GFR NON-AFRICAN AMERICAN: >60 ML/MIN
GFR SERPL CREATININE-BSD FRML MDRD: ABNORMAL ML/MIN/{1.73_M2}
GFR SERPL CREATININE-BSD FRML MDRD: ABNORMAL ML/MIN/{1.73_M2}
GLUCOSE BLD-MCNC: 79 MG/DL (ref 70–99)
POTASSIUM SERPL-SCNC: 5.6 MMOL/L (ref 3.7–5.3)
SODIUM BLD-SCNC: 143 MMOL/L (ref 135–144)

## 2018-11-18 PROCEDURE — G8996 SWALLOW CURRENT STATUS: HCPCS

## 2018-11-18 PROCEDURE — 6360000002 HC RX W HCPCS: Performed by: FAMILY MEDICINE

## 2018-11-18 PROCEDURE — G8997 SWALLOW GOAL STATUS: HCPCS

## 2018-11-18 PROCEDURE — 80048 BASIC METABOLIC PNL TOTAL CA: CPT

## 2018-11-18 PROCEDURE — 97110 THERAPEUTIC EXERCISES: CPT

## 2018-11-18 PROCEDURE — 2580000003 HC RX 258: Performed by: FAMILY MEDICINE

## 2018-11-18 PROCEDURE — 6370000000 HC RX 637 (ALT 250 FOR IP): Performed by: FAMILY MEDICINE

## 2018-11-18 PROCEDURE — 94640 AIRWAY INHALATION TREATMENT: CPT

## 2018-11-18 PROCEDURE — 36415 COLL VENOUS BLD VENIPUNCTURE: CPT

## 2018-11-18 PROCEDURE — 99232 SBSQ HOSP IP/OBS MODERATE 35: CPT | Performed by: FAMILY MEDICINE

## 2018-11-18 PROCEDURE — 2700000000 HC OXYGEN THERAPY PER DAY

## 2018-11-18 PROCEDURE — 94669 MECHANICAL CHEST WALL OSCILL: CPT

## 2018-11-18 PROCEDURE — 1200000000 HC SEMI PRIVATE

## 2018-11-18 RX ORDER — LEVOFLOXACIN 5 MG/ML
500 INJECTION, SOLUTION INTRAVENOUS EVERY 24 HOURS
Status: DISCONTINUED | OUTPATIENT
Start: 2018-11-18 | End: 2018-11-20 | Stop reason: HOSPADM

## 2018-11-18 RX ORDER — IPRATROPIUM BROMIDE AND ALBUTEROL SULFATE 2.5; .5 MG/3ML; MG/3ML
1 SOLUTION RESPIRATORY (INHALATION) 4 TIMES DAILY
Status: DISCONTINUED | OUTPATIENT
Start: 2018-11-18 | End: 2018-11-20 | Stop reason: HOSPADM

## 2018-11-18 RX ADMIN — ACETYLCYSTEINE 600 MG: 200 SOLUTION ORAL; RESPIRATORY (INHALATION) at 10:50

## 2018-11-18 RX ADMIN — Medication 10 ML: at 21:15

## 2018-11-18 RX ADMIN — IPRATROPIUM BROMIDE AND ALBUTEROL SULFATE 1 AMPULE: .5; 3 SOLUTION RESPIRATORY (INHALATION) at 21:26

## 2018-11-18 RX ADMIN — IPRATROPIUM BROMIDE AND ALBUTEROL SULFATE 1 AMPULE: .5; 3 SOLUTION RESPIRATORY (INHALATION) at 17:05

## 2018-11-18 RX ADMIN — Medication 10 ML: at 09:42

## 2018-11-18 RX ADMIN — IPRATROPIUM BROMIDE AND ALBUTEROL SULFATE 1 AMPULE: .5; 3 SOLUTION RESPIRATORY (INHALATION) at 05:49

## 2018-11-18 RX ADMIN — POLYETHYLENE GLYCOL (3350) 17 G: 17 POWDER, FOR SOLUTION ORAL at 09:41

## 2018-11-18 RX ADMIN — FOLIC ACID 1 MG: 1 TABLET ORAL at 09:41

## 2018-11-18 RX ADMIN — LEVOFLOXACIN 500 MG: 5 INJECTION, SOLUTION INTRAVENOUS at 11:57

## 2018-11-18 RX ADMIN — ENOXAPARIN SODIUM 40 MG: 40 INJECTION SUBCUTANEOUS at 09:41

## 2018-11-18 RX ADMIN — POLYETHYLENE GLYCOL (3350) 17 G: 17 POWDER, FOR SOLUTION ORAL at 21:14

## 2018-11-18 RX ADMIN — LACTULOSE 30 G: 20 SOLUTION ORAL at 21:14

## 2018-11-18 RX ADMIN — IPRATROPIUM BROMIDE AND ALBUTEROL SULFATE 1 AMPULE: .5; 3 SOLUTION RESPIRATORY (INHALATION) at 10:50

## 2018-11-18 RX ADMIN — ACETYLCYSTEINE 600 MG: 200 SOLUTION ORAL; RESPIRATORY (INHALATION) at 21:26

## 2018-11-18 RX ADMIN — BUDESONIDE 500 MCG: 0.5 SUSPENSION RESPIRATORY (INHALATION) at 21:26

## 2018-11-18 RX ADMIN — LACTULOSE 30 G: 20 SOLUTION ORAL at 09:41

## 2018-11-18 RX ADMIN — BUDESONIDE 500 MCG: 0.5 SUSPENSION RESPIRATORY (INHALATION) at 10:50

## 2018-11-18 NOTE — PROGRESS NOTES
Facsimile Transmission Cover Sheet    Information contained in this transmission is for the sole use of the intended recipients and may contain confidential and privileged information. Any unauthorized review, use, disclosure or distribution is prohibited. If you are not the intended recipient, please contact the sender and destroy all copies of the original message. Disclosure is made for the purpose of healthcare operations and continuity of care. To: __Dr. Irving Toscano MD_______________________    From: Speech Therapy       Sender:_Lesli Ramirez M.S., CCC-SLP_________________ Phone Number: [phone] (Rukhsana DwaineStewart Varghese current unit  [x][facility] [phone]  [phone]    Dysphagia treatment for Rosa Varghese is ongoing. Based on the results speech therapy recommends:     Modified Barium Swallow Study for current swallow function. Possible GI consult re: G-tube    If you agree please enter the new diet order in [facility] or telephone the nursing unit. Diet will not change without your order.    Thank you,  Electronically signed by: Moon Garcia M.S., Bailey Emerson

## 2018-11-18 NOTE — PROGRESS NOTES
Progress Note    SUBJECTIVE:  respirs easier with mucomyst added  Less rhonchi  Family present today  Discussed his chronic aspiraton and concern for persistent resp problems in future  G tube considerations  Pt is at this time still reluctant    OBJECTIVE:    Vitals:   Vitals:    11/18/18 1652   BP: 125/76   Pulse: 98   Resp: 24   Temp: 99.1 °F (37.3 °C)   SpO2: 94%     Weight: 192 lb (87.1 kg)   Height: 5' 4\" (162.6 cm)   -----------------------------------------------------------------  Exam:    CONSTITUTIONAL:  awake, alert and mild distress  EYES:  lids and lashes normal and pupils equal, round and reactive to light  ENT:  normocepalic, without obvious abnormality  NECK:  supple, symmetrical, trachea midline  HEMATOLOGIC/LYMPHATICS:  no cervical lymphadenopathy  BACK:  symmetric  LUNGS:  Mild respiratory distress and diminished breath sounds throughout lungs  CARDIOVASCULAR:  normal apical pulses, regular rate and rhythm and normal S1 and S2  ABDOMEN:  No scars, normal bowel sounds, soft, non-distended, non-tender, no masses palpated, no hepatosplenomegally    MUSCULOSKELETAL:  there is no redness, warmth, or swelling of the joints  NEUROLOGIC:  Mental Status Exam:  Level of Alertness:   awake  Orientation:   person  Cranial Nerves:  cranial nerves II-XII are grossly intact  SKIN:  no bruising or bleeding  EXT:     no cyanosis, clubbing or edema present    -----------------------------------------------------------------  Diagnostic Data: Reviewed    ASSESSMENT:   Principal Problem:    HCAP (healthcare-associated pneumonia)  Active Problems:    Other cerebral palsy ([address] [address]    Essential (primary) hypertension    Gastroesophageal reflux disease without esophagitis    Coronary atherosclerosis of native coronary artery    Chronic kidney disease (CKD)    Constipation    Pneumonia of left lower lobe due to infectious organism (HCC)    Dehydration    Moderate persistent asthma with acute exacerbation    Acute renal

## 2018-11-18 NOTE — PROGRESS NOTES
of Treatment: 11/18/2018  Evaluating Therapist: Qi Tijerina    Current Diet level:  Current Diet : Puree  Current Liquid Diet : Pudding      Primary Complaint   Denies pain during dysphagia tx. Pain:  Pain Assessment  Patient Currently in Pain: Denies    Reason for Referral  Avni Grimaldo was referred for a bedside swallow evaluation to assess the efficiency of his swallow function, identify signs and symptoms of aspiration and make recommendations regarding safe dietary consistencies, effective compensatory strategies, and safe eating environment. Impression  Dysphagia Diagnosis: Severe pharyngeal stage dysphagia; Severe oral stage dysphagia  Dysphagia Outcome Severity Scale: Level 2: Moderate Severe dysphagia- Maximum assistance or maximum use of strategies with partial PO only     Treatment Plan  Requires SLP Intervention: Yes  Duration/Frequency of Treatment: Daily during inpatient stay  D/C Recommendations: SNF       Recommended Diet and Intervention  Diet Solids Recommendation: Other (comment) (NO SOLIDS)  Liquid Consistency Recommendation: Pudding  Recommended Form of Meds: Crushed in puree as able  Therapeutic Interventions: Therapeutic PO trials with SLP;Patient/Family education;Diet tolerance monitoring    Compensatory Swallowing Strategies  Compensatory Swallowing Strategies: Alternate solids and liquids;Eat/Feed slowly; No straws;Upright as possible for all oral intake;Assist feed;Remain upright for 30-45 minutes after meals;Small bites/sips;Swallow 2 times per bite/sip    Treatment/Goals  Short-term Goals  Timeframe for Short-term Goals: 10 days  Goal 1: Pt will complete trials of HTL w/ST ONLY w/ no overt s/s of aspiration/penetration x10  Goal 2: Pt will complete trials of puree w/ST ONLY w/ no overt s/s of aspiration/penetration x5  Goal 3: Provide caregiver with education and recommendations for safe swallows  Goal 4: Patient and/or caregiver will utilize compensatory strategies during recommended in order to accurately study pt's current swallow function as it continues to regress. Again, pt and caregiver should consider alternative means of nutrition (G-tube placement) d/t significant aspiration risk. Dr. Alton Lara on 11/17/18 stated that pt is refusing G-tube placement and education. With current diet of PTL ONLY, the following compensatory strategies are recommended: Alternate solids and liquids;Eat/Feed slowly; No straws;Upright as possible for all oral intake;Assist feed;Remain upright for 30-45 minutes after meals;Small bites/sips;Swallow 2 times per bite/sip.        Bev Ruiz M.S.,CCC- SLP  11/18/2018 1:35 PM

## 2018-11-18 NOTE — PLAN OF CARE
Problem: Falls - Risk of:  Goal: Will remain free from falls  Will remain free from falls   Outcome: Ongoing  Call light in reach at all times, frequent checks, bed in lowest position, wheels of bed and chair locked, non skid footwear on, appropriate transfer techniques, personal items within reach, walkways free of obstructions, fall risk armband and sign displayed, Vasquez fall risk score per protocol. No falls this shift, will continue to monitor. Problem: Risk for Impaired Skin Integrity  Goal: Tissue integrity - skin and mucous membranes  Structural intactness and normal physiological function of skin and  mucous membranes. Outcome: Ongoing  Car scale monitoring per protocol. Inspect skin for breakdown frequently. Encourage pt to make frequent large adjustments in position or assist patient with turning. Document all areas of breakdown. Problem: Musculor/Skeletal Functional Status  Goal: Highest potential functional level  Outcome: Ongoing  Therapy working with pt. Problem: Airway Clearance - Ineffective:  Goal: Clear lung sounds  Clear lung sounds   Outcome: Ongoing  Lung sound clear diminished upon auscultation this morning. Breathing treatments appear to be helping. Goal: Ability to maintain a clear airway will improve  Ability to maintain a clear airway will improve    Outcome: Ongoing  Pt sometimes able to cough to clear secretions. Oral suctioning as needed. Problem: Gas Exchange - Impaired:  Goal: Levels of oxygenation will improve  Levels of oxygenation will improve   Outcome: Ongoing  SpO2 within normal limits while on 3 liters of oxygen per nasal cannula. Problem: Pain:  Goal: Pain level will decrease  Pain level will decrease   Outcome: Ongoing  No complaints of pain at this time. Problem: Nutritional:  Goal: Consumption of food in small portions  Consumption of food in small portions   Outcome: Ongoing  Speech working with pt, refusing food.      Problem:

## 2018-11-19 ENCOUNTER — APPOINTMENT (OUTPATIENT)
Dept: GENERAL RADIOLOGY | Age: 78
DRG: 193 | End: 2018-11-19
Payer: MEDICARE

## 2018-11-19 LAB
ANION GAP SERPL CALCULATED.3IONS-SCNC: 14 MMOL/L (ref 9–17)
BUN BLDV-MCNC: 25 MG/DL (ref 8–23)
BUN/CREAT BLD: 20 (ref 9–20)
CALCIUM SERPL-MCNC: 8.6 MG/DL (ref 8.6–10.4)
CHLORIDE BLD-SCNC: 108 MMOL/L (ref 98–107)
CO2: 26 MMOL/L (ref 20–31)
CREAT SERPL-MCNC: 1.23 MG/DL (ref 0.7–1.2)
CULTURE: NORMAL
CULTURE: NORMAL
GFR AFRICAN AMERICAN: >60 ML/MIN
GFR NON-AFRICAN AMERICAN: 57 ML/MIN
GFR SERPL CREATININE-BSD FRML MDRD: ABNORMAL ML/MIN/{1.73_M2}
GFR SERPL CREATININE-BSD FRML MDRD: ABNORMAL ML/MIN/{1.73_M2}
GLUCOSE BLD-MCNC: 96 MG/DL (ref 70–99)
Lab: NORMAL
Lab: NORMAL
POTASSIUM SERPL-SCNC: 4.6 MMOL/L (ref 3.7–5.3)
PROCALCITONIN: 0.2 NG/ML
SODIUM BLD-SCNC: 148 MMOL/L (ref 135–144)
SPECIMEN DESCRIPTION: NORMAL
SPECIMEN DESCRIPTION: NORMAL
STATUS: NORMAL
STATUS: NORMAL

## 2018-11-19 PROCEDURE — 6360000002 HC RX W HCPCS: Performed by: FAMILY MEDICINE

## 2018-11-19 PROCEDURE — 2580000003 HC RX 258: Performed by: FAMILY MEDICINE

## 2018-11-19 PROCEDURE — 74018 RADEX ABDOMEN 1 VIEW: CPT

## 2018-11-19 PROCEDURE — 94640 AIRWAY INHALATION TREATMENT: CPT

## 2018-11-19 PROCEDURE — 2700000000 HC OXYGEN THERAPY PER DAY

## 2018-11-19 PROCEDURE — G8997 SWALLOW GOAL STATUS: HCPCS

## 2018-11-19 PROCEDURE — 94760 N-INVAS EAR/PLS OXIMETRY 1: CPT

## 2018-11-19 PROCEDURE — 97530 THERAPEUTIC ACTIVITIES: CPT

## 2018-11-19 PROCEDURE — 84145 PROCALCITONIN (PCT): CPT

## 2018-11-19 PROCEDURE — 92611 MOTION FLUOROSCOPY/SWALLOW: CPT

## 2018-11-19 PROCEDURE — 97110 THERAPEUTIC EXERCISES: CPT

## 2018-11-19 PROCEDURE — 74230 X-RAY XM SWLNG FUNCJ C+: CPT

## 2018-11-19 PROCEDURE — 2500000003 HC RX 250 WO HCPCS: Performed by: FAMILY MEDICINE

## 2018-11-19 PROCEDURE — G8996 SWALLOW CURRENT STATUS: HCPCS

## 2018-11-19 PROCEDURE — 6370000000 HC RX 637 (ALT 250 FOR IP): Performed by: FAMILY MEDICINE

## 2018-11-19 PROCEDURE — 99232 SBSQ HOSP IP/OBS MODERATE 35: CPT | Performed by: FAMILY MEDICINE

## 2018-11-19 PROCEDURE — 36415 COLL VENOUS BLD VENIPUNCTURE: CPT

## 2018-11-19 PROCEDURE — 94668 MNPJ CHEST WALL SBSQ: CPT

## 2018-11-19 PROCEDURE — 99221 1ST HOSP IP/OBS SF/LOW 40: CPT | Performed by: INTERNAL MEDICINE

## 2018-11-19 PROCEDURE — 1200000000 HC SEMI PRIVATE

## 2018-11-19 PROCEDURE — 80048 BASIC METABOLIC PNL TOTAL CA: CPT

## 2018-11-19 RX ORDER — SODIUM PHOSPHATE, DIBASIC AND SODIUM PHOSPHATE, MONOBASIC 7; 19 G/133ML; G/133ML
1 ENEMA RECTAL ONCE
Status: COMPLETED | OUTPATIENT
Start: 2018-11-19 | End: 2018-11-19

## 2018-11-19 RX ORDER — DEXTROSE, SODIUM CHLORIDE, AND POTASSIUM CHLORIDE 5; .45; .15 G/100ML; G/100ML; G/100ML
INJECTION INTRAVENOUS CONTINUOUS
Status: DISCONTINUED | OUTPATIENT
Start: 2018-11-19 | End: 2018-11-20 | Stop reason: HOSPADM

## 2018-11-19 RX ADMIN — IPRATROPIUM BROMIDE AND ALBUTEROL SULFATE 1 AMPULE: .5; 3 SOLUTION RESPIRATORY (INHALATION) at 05:45

## 2018-11-19 RX ADMIN — ENOXAPARIN SODIUM 40 MG: 40 INJECTION SUBCUTANEOUS at 08:10

## 2018-11-19 RX ADMIN — LEVOFLOXACIN 500 MG: 5 INJECTION, SOLUTION INTRAVENOUS at 12:03

## 2018-11-19 RX ADMIN — IPRATROPIUM BROMIDE AND ALBUTEROL SULFATE 1 AMPULE: .5; 3 SOLUTION RESPIRATORY (INHALATION) at 14:51

## 2018-11-19 RX ADMIN — POTASSIUM CHLORIDE, DEXTROSE MONOHYDRATE AND SODIUM CHLORIDE: 150; 5; 450 INJECTION, SOLUTION INTRAVENOUS at 20:31

## 2018-11-19 RX ADMIN — ACETYLCYSTEINE 600 MG: 200 SOLUTION ORAL; RESPIRATORY (INHALATION) at 19:21

## 2018-11-19 RX ADMIN — IPRATROPIUM BROMIDE AND ALBUTEROL SULFATE 1 AMPULE: .5; 3 SOLUTION RESPIRATORY (INHALATION) at 19:20

## 2018-11-19 RX ADMIN — SODIUM PHOSPHATE, DIBASIC AND SODIUM PHOSPHATE, MONOBASIC 1 ENEMA: 7; 19 ENEMA RECTAL at 14:45

## 2018-11-19 RX ADMIN — Medication 10 ML: at 08:10

## 2018-11-19 RX ADMIN — BUDESONIDE 500 MCG: 0.5 SUSPENSION RESPIRATORY (INHALATION) at 19:48

## 2018-11-19 RX ADMIN — POTASSIUM CHLORIDE, DEXTROSE MONOHYDRATE AND SODIUM CHLORIDE: 150; 5; 450 INJECTION, SOLUTION INTRAVENOUS at 08:10

## 2018-11-19 NOTE — PROGRESS NOTES
Left a message for the nieces to discuss about a feeding tube vs hospice care. Ask them to call back.      TEGAN Qiu

## 2018-11-19 NOTE — PROGRESS NOTES
Nursing supervisor notified of MEWS score of 5 and fax sent to Dr. Gonsalo Cardoza. Pt appears comfortable in bed, no signs of distress noted. Will continue to monitor.

## 2018-11-19 NOTE — PROGRESS NOTES
Physical Therapy  Facility/Department: [facility] AT THE [facility] MED SURG  Daily Treatment Note  NAME: Steven Suero  : 1940  MRN: [id number]    Date of Service: 2018    Discharge Recommendations:  Continue to assess pending progress        Patient Diagnosis(es): The primary encounter diagnosis was Pneumonia of left lower lobe due to infectious organism [facility]). Diagnoses of Dehydration and Constipation, unspecified constipation type were also pertinent to this visit. has a past medical history of Abnormal posture; Age-related nuclear cataract; Anemia; BPH (benign prostatic hyperplasia); Cancer ([address] Cerebral palsy ([facility] [address] Difficulty in walking; Dysphagia; GERD (gastroesophageal reflux disease); Heart disease; Hypertension; Kidney disease; Other developmental disorders of speech and language (CODE); Primary generalized (osteo)arthritis; and Urinary incontinence. has a past surgical history that includes cyst removal and Gastrostomy tube placement. Restrictions  Restrictions/Precautions  Restrictions/Precautions: General Precautions, Fall Risk  Subjective   General  Chart Reviewed: Yes  Response To Previous Treatment: Patient unable to report, no changes reported from family or staff  Family / Caregiver Present: No  Referring Practitioner: Dr. Rikki Torres: Pt is nonverbal. Unable to report pain. None noted through facial expressions. Orientation  Orientation  Overall Orientation Status: Impaired  Orientation Level: Unable to assess  Cognition      Objective   Bed mobility  Rolling to Left: Minimal assistance  Rolling to Right: Minimal assistance  Comment: Cues needed for participation. Ambulation  Ambulation?: No  Stairs/Curb  Stairs?: No        Exercises  Straight Leg Raise: 20x  Heelslides: 20x  Hip Flexion: 20x  Hip Abduction: 20x  Knee Short Arc Quad: 20x  Knee Passive Range of Motion: 20x  Ankle Pumps: 20x  Comments: Above exercises completed in supine and PROM.

## 2018-11-20 ENCOUNTER — APPOINTMENT (OUTPATIENT)
Dept: GENERAL RADIOLOGY | Age: 78
DRG: 193 | End: 2018-11-20
Payer: MEDICARE

## 2018-11-20 VITALS
HEART RATE: 98 BPM | HEIGHT: 64 IN | BODY MASS INDEX: 31.92 KG/M2 | SYSTOLIC BLOOD PRESSURE: 118 MMHG | RESPIRATION RATE: 22 BRPM | DIASTOLIC BLOOD PRESSURE: 66 MMHG | WEIGHT: 187 LBS | OXYGEN SATURATION: 93 % | TEMPERATURE: 97.8 F

## 2018-11-20 LAB
ANION GAP SERPL CALCULATED.3IONS-SCNC: 11 MMOL/L (ref 9–17)
BUN BLDV-MCNC: 22 MG/DL (ref 8–23)
BUN/CREAT BLD: 18 (ref 9–20)
CALCIUM SERPL-MCNC: 8.2 MG/DL (ref 8.6–10.4)
CHLORIDE BLD-SCNC: 106 MMOL/L (ref 98–107)
CO2: 26 MMOL/L (ref 20–31)
CREAT SERPL-MCNC: 1.23 MG/DL (ref 0.7–1.2)
GFR AFRICAN AMERICAN: >60 ML/MIN
GFR NON-AFRICAN AMERICAN: 57 ML/MIN
GFR SERPL CREATININE-BSD FRML MDRD: ABNORMAL ML/MIN/{1.73_M2}
GFR SERPL CREATININE-BSD FRML MDRD: ABNORMAL ML/MIN/{1.73_M2}
GLUCOSE BLD-MCNC: 147 MG/DL (ref 70–99)
POTASSIUM SERPL-SCNC: 4 MMOL/L (ref 3.7–5.3)
SODIUM BLD-SCNC: 143 MMOL/L (ref 135–144)

## 2018-11-20 PROCEDURE — 6370000000 HC RX 637 (ALT 250 FOR IP): Performed by: FAMILY MEDICINE

## 2018-11-20 PROCEDURE — 2700000000 HC OXYGEN THERAPY PER DAY

## 2018-11-20 PROCEDURE — 74018 RADEX ABDOMEN 1 VIEW: CPT

## 2018-11-20 PROCEDURE — 94640 AIRWAY INHALATION TREATMENT: CPT

## 2018-11-20 PROCEDURE — 36415 COLL VENOUS BLD VENIPUNCTURE: CPT

## 2018-11-20 PROCEDURE — 94761 N-INVAS EAR/PLS OXIMETRY MLT: CPT

## 2018-11-20 PROCEDURE — 6360000002 HC RX W HCPCS: Performed by: FAMILY MEDICINE

## 2018-11-20 PROCEDURE — 80048 BASIC METABOLIC PNL TOTAL CA: CPT

## 2018-11-20 PROCEDURE — 99239 HOSP IP/OBS DSCHRG MGMT >30: CPT | Performed by: FAMILY MEDICINE

## 2018-11-20 RX ORDER — SODIUM PHOSPHATE, DIBASIC AND SODIUM PHOSPHATE, MONOBASIC 7; 19 G/133ML; G/133ML
1 ENEMA RECTAL ONCE
Status: COMPLETED | OUTPATIENT
Start: 2018-11-20 | End: 2018-11-20

## 2018-11-20 RX ORDER — IPRATROPIUM BROMIDE AND ALBUTEROL SULFATE 2.5; .5 MG/3ML; MG/3ML
3 SOLUTION RESPIRATORY (INHALATION) 4 TIMES DAILY
Qty: 360 ML | DISCHARGE
Start: 2018-11-20

## 2018-11-20 RX ORDER — LEVOFLOXACIN 500 MG/1
500 TABLET, FILM COATED ORAL DAILY
Qty: 7 TABLET | Refills: 0 | DISCHARGE
Start: 2018-11-20 | End: 2018-11-27

## 2018-11-20 RX ORDER — POLYETHYLENE GLYCOL 3350 17 G/17G
34 POWDER, FOR SOLUTION ORAL 2 TIMES DAILY
Qty: 527 G | Refills: 1 | DISCHARGE
Start: 2018-11-20 | End: 2018-12-20

## 2018-11-20 RX ORDER — ACETYLCYSTEINE 200 MG/ML
600 SOLUTION ORAL; RESPIRATORY (INHALATION) 2 TIMES DAILY
DISCHARGE
Start: 2018-11-20

## 2018-11-20 RX ADMIN — BUDESONIDE 500 MCG: 0.5 SUSPENSION RESPIRATORY (INHALATION) at 12:00

## 2018-11-20 RX ADMIN — SODIUM PHOSPHATE, DIBASIC AND SODIUM PHOSPHATE, MONOBASIC 1 ENEMA: 7; 19 ENEMA RECTAL at 10:38

## 2018-11-20 RX ADMIN — IPRATROPIUM BROMIDE AND ALBUTEROL SULFATE 1 AMPULE: .5; 3 SOLUTION RESPIRATORY (INHALATION) at 12:00

## 2018-11-20 RX ADMIN — IPRATROPIUM BROMIDE AND ALBUTEROL SULFATE 1 AMPULE: .5; 3 SOLUTION RESPIRATORY (INHALATION) at 05:35

## 2018-11-20 RX ADMIN — ENOXAPARIN SODIUM 40 MG: 40 INJECTION SUBCUTANEOUS at 09:45

## 2018-11-20 RX ADMIN — LEVOFLOXACIN 500 MG: 5 INJECTION, SOLUTION INTRAVENOUS at 10:15

## 2018-11-20 ASSESSMENT — PAIN SCALES - WONG BAKER
WONGBAKER_NUMERICALRESPONSE: 0

## 2018-11-20 NOTE — CONSULTS
status: Single     Spouse name: N/A    Number of children: N/A    Years of education: N/A     Occupational History    Not on file.      Social History Main Topics    Smoking status: Never Smoker    Smokeless tobacco: Never Used    Alcohol use No    Drug use: No    Sexual activity: No     Other Topics Concern    Not on file     Social History Narrative    No narrative on file       Outpatient Prescriptions Marked as Taking for the 11/14/18 encounter [facility] HOS[facility] Encounter)   Medication Sig Dispense Refill    Umeclidinium Bromide (INCRUSE ELLIPTA) 62.5 MCG/INH AEPB Inhale 2 puffs into the lungs daily      lisinopril (PRINIVIL;ZESTRIL) 10 MG tablet Take 10 mg by mouth daily      ipratropium-albuterol (DUONEB) 0.5-2.5 (3) MG/3ML SOLN nebulizer solution Inhale 1 vial into the lungs 2 times daily      lactulose (CEPHULAC) 10 g packet Take 30 g by mouth 2 times daily      budesonide (PULMICORT) 0.5 MG/2ML nebulizer suspension Take 1 ampule by nebulization 2 times daily      bisacodyl (DULCOLAX) 10 MG suppository Place 10 mg rectally daily as needed for Constipation      ipratropium-albuterol (DUONEB) 0.5-2.5 (3) MG/3ML SOLN nebulizer solution Inhale 1 vial into the lungs every 6 hours as needed for Shortness of Breath      folic acid (FOLVITE) 1 MG tablet Take 1 mg by mouth daily       esomeprazole (NEXIUM) 40 MG delayed release capsule Take 40 mg by mouth every morning (before breakfast)       Calcium 600-200 MG-UNIT TABS Take 600 mg by mouth 2 times daily       acetaminophen (TYLENOL) 500 MG tablet Take 1,000 mg by mouth      amLODIPine (NORVASC) 5 MG tablet Take 5 mg by mouth daily       DIGOX 125 MCG tablet Take 125 mcg by mouth daily       docusate sodium (COLACE) 100 MG capsule Take 100 mg by mouth      magnesium hydroxide (MILK OF MAGNESIA) 400 MG/5ML suspension Take 15 mLs by mouth nightly       furosemide (LASIX) 40 MG tablet Take 40 mg by mouth daily          Allergies as of 11/14/2018   

## 2018-11-21 ENCOUNTER — OUTSIDE SERVICES (OUTPATIENT)
Dept: FAMILY MEDICINE CLINIC | Age: 78
End: 2018-11-21
Payer: MEDICARE

## 2018-11-21 DIAGNOSIS — K59.00 CONSTIPATION, UNSPECIFIED CONSTIPATION TYPE: ICD-10-CM

## 2018-11-21 DIAGNOSIS — G80.8 OTHER CEREBRAL PALSY (HCC): ICD-10-CM

## 2018-11-21 DIAGNOSIS — J18.9 HCAP (HEALTHCARE-ASSOCIATED PNEUMONIA): ICD-10-CM

## 2018-11-21 DIAGNOSIS — R13.12 DYSPHAGIA, OROPHARYNGEAL PHASE: ICD-10-CM

## 2018-11-21 DIAGNOSIS — I10 ESSENTIAL (PRIMARY) HYPERTENSION: ICD-10-CM

## 2018-11-21 DIAGNOSIS — N18.30 ACUTE RENAL FAILURE WITH ACUTE TUBULAR NECROSIS SUPERIMPOSED ON STAGE 3 CHRONIC KIDNEY DISEASE (HCC): ICD-10-CM

## 2018-11-21 DIAGNOSIS — F79 MENTAL RETARDATION: ICD-10-CM

## 2018-11-21 DIAGNOSIS — T17.908S ASPIRATION, CHRONIC PULMONARY, SEQUELA: ICD-10-CM

## 2018-11-21 DIAGNOSIS — E44.0 MODERATE MALNUTRITION (HCC): Primary | ICD-10-CM

## 2018-11-21 DIAGNOSIS — N17.0 ACUTE RENAL FAILURE WITH ACUTE TUBULAR NECROSIS SUPERIMPOSED ON STAGE 3 CHRONIC KIDNEY DISEASE (HCC): ICD-10-CM

## 2018-11-21 DIAGNOSIS — J45.41 MODERATE PERSISTENT ASTHMA WITH ACUTE EXACERBATION: ICD-10-CM

## 2018-11-21 PROBLEM — E87.0 HYPERNATREMIA: Status: ACTIVE | Noted: 2018-11-21

## 2018-11-21 PROCEDURE — 1123F ACP DISCUSS/DSCN MKR DOCD: CPT | Performed by: FAMILY MEDICINE

## 2018-11-21 PROCEDURE — G8484 FLU IMMUNIZE NO ADMIN: HCPCS | Performed by: FAMILY MEDICINE

## 2018-11-21 PROCEDURE — 1101F PT FALLS ASSESS-DOCD LE1/YR: CPT | Performed by: FAMILY MEDICINE

## 2018-11-21 PROCEDURE — 99305 1ST NF CARE MODERATE MDM 35: CPT | Performed by: FAMILY MEDICINE

## 2022-04-21 NOTE — PROGRESS NOTES
per day: Daily  Current Treatment Recommendations: Strengthening, ROM, Self-Care / ADL  G-Code     OutComes Score                                           AM-PAC Score             Goals  Short term goals  Time Frame for Short term goals: 10 days  Short term goal 1: Pt. will tolerate daily ROM to UE's to maintain ROM/prevent contractures. Short term goal 2: Pt. will engage in simple feeding/grooming tasks to decrease dependency upon caregivers.        Therapy Time   Individual Concurrent Group Co-treatment   Time In 5548 [facility]         Time Out [id number] [facility], [address] [id number]

## 2024-08-27 NOTE — PROGRESS NOTES
Subjective:      Patient ID: Lorena Smiley is a 70 y.o. male. HPI  Patient is a 70 y.o. male in no acute distress and alert and oriented to person, place and time. History  New Patient - referred by Dr. Belen Nguyen for Microscopic Hematuria/abdominal pain. Patient had CT performed on 07/11/2018. Patient is a resident at [employer] in [place]. He is present today with a family member- niece. Patient has developmental disorder - speech. Niece mentioned around 3 weeks ago the patient had c/o abdominal pain and microscopic hematuria was found. Currently  Patient is here today for lower tract visualization. Pt is unable to get his legs in dorsal lithotomy. Pt did have a recent CT urogram that was independently reviewed today. The right renal mass described before is non-enhancing and appears to be a hyperdense cyst. Pt does a have a small non specific nodule near the bladder. There are bone islands in the pelvis. Pt does have significant constipation on the film. Cystoscopy Procedure Note    Indications:    Diagnosis Orders   1. Renal mass  CT UROGRAM       Pre-operative Diagnosis:    Diagnosis Orders   1. Renal mass  CT UROGRAM       Post-operative Diagnosis: Same    Surgeon: Mayo Lesches     Assistants: None    Anesthesia : Local    Procedure Details   The risks, benefits, complications, treatment options, and expected outcomes were discussed with the patient. The patient concurred with the proposed plan, giving informed consent. Cystoscopy was performed today under local anesthesia, using sterile technique. The patient was placed in the dorsal lithotomy position, prepped with CHG, and draped in the usual sterile fashion. A 14 Armenian flexible cystoscope was used to systematically inspect both the urethra and bladder in their entirety. Findings:  Anterior urethra: normal without strictures  Hyperplasia: minimal  Bladder: Normal mucosa, without lesions.   Ureteral orifice(s) was/were seen in The ECG revealed a sinus rhythm. The ECG rate was 62 bpm. tablet Take 40 mg by mouth 2 times daily       No current facility-administered medications on file prior to visit. Outpatient Encounter Prescriptions as of 8/8/2018   Medication Sig Dispense Refill    finasteride (PROSCAR) 5 MG tablet       folic acid (FOLVITE) 1 MG tablet Take 1 mg by mouth      esomeprazole (NEXIUM) 40 MG delayed release capsule       lactulose (CEPHULAC) 20 g packet Take 20 g by mouth      lactulose encephalopathy 10 GM/15ML SOLN solution       Calcium 600-200 MG-UNIT TABS Take 600 mg by mouth      glycopyrrolate (ROBINUL) 1 MG tablet Take 1 mg by mouth      acetaminophen (TYLENOL) 650 MG suppository Place 650 mg rectally      acetaminophen (TYLENOL) 500 MG tablet Take 1,000 mg by mouth      loperamide (IMODIUM) 2 MG capsule       loratadine (CLARITIN) 10 MG tablet Take 10 mg by mouth      amLODIPine (NORVASC) 5 MG tablet       DIGOX 125 MCG tablet       nitroGLYCERIN (NITROSTAT) 0.4 MG SL tablet Place 0.4 mg under the tongue      docusate sodium (COLACE) 100 MG capsule Take 100 mg by mouth      magnesium hydroxide (MILK OF MAGNESIA) 400 MG/5ML suspension Take 15 mLs by mouth      furosemide (LASIX) 40 MG tablet Take 40 mg by mouth 2 times daily       No facility-administered encounter medications on file as of 8/8/2018. Patient has no known allergies. History   Smoking Status    Never Smoker   Smokeless Tobacco    Never Used       History   Alcohol Use No       There were no vitals filed for this visit. Constitutional: Patient in no acute distress; Neuro: alert and oriented to person place and time. Psych: Mood and affect normal.  Skin: Normal  Lungs: Respiratory effort normal  Cardiovascular:  Normal peripheral pulses  Abdomen: Soft, non-tender, non-distended with no CVA, flank pain, hepatosplenomegaly or hernia. Kidneys normal.  Bladder non-tender and not distended.   Lymphatics: no palpable lymphadenopathy  Penis normal  Urethral meatus normal  Scrotal